# Patient Record
Sex: MALE | Race: WHITE | Employment: OTHER | URBAN - METROPOLITAN AREA
[De-identification: names, ages, dates, MRNs, and addresses within clinical notes are randomized per-mention and may not be internally consistent; named-entity substitution may affect disease eponyms.]

---

## 2017-12-26 LAB
ANION GAP SERPL CALCULATED.3IONS-SCNC: 14 MEQ/L (ref 7–13)
BASOPHILS ABSOLUTE: 0 K/UL (ref 0–0.2)
BASOPHILS RELATIVE PERCENT: 0.4 %
BUN BLDV-MCNC: 23 MG/DL (ref 8–23)
CALCIUM SERPL-MCNC: 8.2 MG/DL (ref 8.6–10.2)
CHLORIDE BLD-SCNC: 101 MEQ/L (ref 98–107)
CHOLESTEROL, TOTAL: 120 MG/DL (ref 0–199)
CO2: 23 MEQ/L (ref 22–29)
CREAT SERPL-MCNC: 1.06 MG/DL (ref 0.7–1.2)
EOSINOPHILS ABSOLUTE: 0.3 K/UL (ref 0–0.7)
EOSINOPHILS RELATIVE PERCENT: 3.9 %
GFR AFRICAN AMERICAN: >60
GFR NON-AFRICAN AMERICAN: >60
GLUCOSE BLD-MCNC: 99 MG/DL (ref 74–109)
HCT VFR BLD CALC: 36.3 % (ref 42–52)
HDLC SERPL-MCNC: 35 MG/DL (ref 40–59)
HEMOGLOBIN: 12.3 G/DL (ref 14–18)
LDL CHOLESTEROL CALCULATED: 68 MG/DL (ref 0–129)
LYMPHOCYTES ABSOLUTE: 1 K/UL (ref 1–4.8)
LYMPHOCYTES RELATIVE PERCENT: 13.4 %
MCH RBC QN AUTO: 30.8 PG (ref 27–31.3)
MCHC RBC AUTO-ENTMCNC: 33.8 % (ref 33–37)
MCV RBC AUTO: 91.1 FL (ref 80–100)
MONOCYTES ABSOLUTE: 0.9 K/UL (ref 0.2–0.8)
MONOCYTES RELATIVE PERCENT: 12.1 %
NEUTROPHILS ABSOLUTE: 5.1 K/UL (ref 1.4–6.5)
NEUTROPHILS RELATIVE PERCENT: 70.2 %
PDW BLD-RTO: 14.4 % (ref 11.5–14.5)
PLATELET # BLD: 152 K/UL (ref 130–400)
POTASSIUM SERPL-SCNC: 3.9 MEQ/L (ref 3.5–5.1)
RBC # BLD: 3.99 M/UL (ref 4.7–6.1)
SODIUM BLD-SCNC: 138 MEQ/L (ref 132–144)
TRIGL SERPL-MCNC: 86 MG/DL (ref 0–200)
TSH SERPL DL<=0.05 MIU/L-ACNC: 3.37 UIU/ML (ref 0.27–4.2)
WBC # BLD: 7.3 K/UL (ref 4.8–10.8)

## 2017-12-27 RX ORDER — GUAIFENESIN AND DEXTROMETHORPHAN HYDROBROMIDE 100; 10 MG/5ML; MG/5ML
5 SOLUTION ORAL EVERY 6 HOURS PRN
COMMUNITY
Start: 2017-12-24 | End: 2018-01-02

## 2017-12-27 RX ORDER — ACETAMINOPHEN 325 MG/1
650 TABLET ORAL EVERY 4 HOURS PRN
COMMUNITY
End: 2018-05-30

## 2017-12-27 RX ORDER — MAGNESIUM HYDROXIDE/ALUMINUM HYDROXICE/SIMETHICONE 120; 1200; 1200 MG/30ML; MG/30ML; MG/30ML
30 SUSPENSION ORAL EVERY 4 HOURS PRN
COMMUNITY
End: 2018-05-30

## 2017-12-27 RX ORDER — ALBUTEROL SULFATE 2.5 MG/3ML
2.5 SOLUTION RESPIRATORY (INHALATION) EVERY 4 HOURS PRN
COMMUNITY
End: 2018-01-30 | Stop reason: ALTCHOICE

## 2018-01-04 LAB
ANION GAP SERPL CALCULATED.3IONS-SCNC: 11 MEQ/L (ref 7–13)
BUN BLDV-MCNC: 20 MG/DL (ref 8–23)
CALCIUM SERPL-MCNC: 8.4 MG/DL (ref 8.6–10.2)
CHLORIDE BLD-SCNC: 97 MEQ/L (ref 98–107)
CO2: 27 MEQ/L (ref 22–29)
CREAT SERPL-MCNC: 0.95 MG/DL (ref 0.7–1.2)
GFR AFRICAN AMERICAN: >60
GFR NON-AFRICAN AMERICAN: >60
GLUCOSE BLD-MCNC: 104 MG/DL (ref 74–109)
POTASSIUM SERPL-SCNC: 4.5 MEQ/L (ref 3.5–5.1)
SODIUM BLD-SCNC: 135 MEQ/L (ref 132–144)

## 2018-01-07 LAB
RAPID INFLUENZA  B AGN: NEGATIVE
RAPID INFLUENZA A AGN: NEGATIVE

## 2018-01-30 ENCOUNTER — OFFICE VISIT (OUTPATIENT)
Dept: GERIATRIC MEDICINE | Age: 83
End: 2018-01-30
Payer: MEDICARE

## 2018-01-30 DIAGNOSIS — M19.90 ARTHRITIS: ICD-10-CM

## 2018-01-30 DIAGNOSIS — S80.821A BLISTER OF RIGHT LOWER EXTREMITY, INITIAL ENCOUNTER: ICD-10-CM

## 2018-01-30 DIAGNOSIS — F03.90 DEMENTIA WITHOUT BEHAVIORAL DISTURBANCE, UNSPECIFIED DEMENTIA TYPE: Primary | ICD-10-CM

## 2018-01-30 PROCEDURE — 99309 SBSQ NF CARE MODERATE MDM 30: CPT | Performed by: NURSE PRACTITIONER

## 2018-01-30 PROCEDURE — 1123F ACP DISCUSS/DSCN MKR DOCD: CPT | Performed by: NURSE PRACTITIONER

## 2018-01-30 ASSESSMENT — ENCOUNTER SYMPTOMS
WHEEZING: 0
COUGH: 0
ABDOMINAL PAIN: 0
SHORTNESS OF BREATH: 0

## 2018-01-31 VITALS
WEIGHT: 159 LBS | OXYGEN SATURATION: 95 % | SYSTOLIC BLOOD PRESSURE: 119 MMHG | HEART RATE: 75 BPM | BODY MASS INDEX: 25.55 KG/M2 | HEIGHT: 66 IN | TEMPERATURE: 97.9 F | RESPIRATION RATE: 17 BRPM | DIASTOLIC BLOOD PRESSURE: 64 MMHG

## 2018-01-31 RX ORDER — ALBUTEROL SULFATE 2.5 MG/3ML
2.5 SOLUTION RESPIRATORY (INHALATION) EVERY 4 HOURS PRN
COMMUNITY

## 2018-02-16 ENCOUNTER — OFFICE VISIT (OUTPATIENT)
Dept: GERIATRIC MEDICINE | Age: 83
End: 2018-02-16
Payer: MEDICARE

## 2018-02-16 VITALS
DIASTOLIC BLOOD PRESSURE: 70 MMHG | HEART RATE: 71 BPM | SYSTOLIC BLOOD PRESSURE: 110 MMHG | OXYGEN SATURATION: 95 % | WEIGHT: 156 LBS | TEMPERATURE: 97.7 F | RESPIRATION RATE: 18 BRPM | BODY MASS INDEX: 25.07 KG/M2 | HEIGHT: 66 IN

## 2018-02-16 DIAGNOSIS — R60.0 BILATERAL LOWER EXTREMITY EDEMA: Primary | ICD-10-CM

## 2018-02-16 DIAGNOSIS — I10 ESSENTIAL HYPERTENSION: ICD-10-CM

## 2018-02-16 PROCEDURE — 99308 SBSQ NF CARE LOW MDM 20: CPT | Performed by: NURSE PRACTITIONER

## 2018-02-16 PROCEDURE — 1123F ACP DISCUSS/DSCN MKR DOCD: CPT | Performed by: NURSE PRACTITIONER

## 2018-02-20 ENCOUNTER — OFFICE VISIT (OUTPATIENT)
Dept: GERIATRIC MEDICINE | Age: 83
End: 2018-02-20
Payer: MEDICARE

## 2018-02-20 DIAGNOSIS — R60.0 BILATERAL LOWER EXTREMITY EDEMA: Primary | ICD-10-CM

## 2018-02-20 DIAGNOSIS — I10 ESSENTIAL HYPERTENSION: ICD-10-CM

## 2018-02-20 LAB
ANION GAP SERPL CALCULATED.3IONS-SCNC: 15 MEQ/L (ref 7–13)
BUN BLDV-MCNC: 16 MG/DL (ref 8–23)
CALCIUM SERPL-MCNC: 8.5 MG/DL (ref 8.6–10.2)
CHLORIDE BLD-SCNC: 101 MEQ/L (ref 98–107)
CO2: 22 MEQ/L (ref 22–29)
CREAT SERPL-MCNC: 0.93 MG/DL (ref 0.7–1.2)
GFR AFRICAN AMERICAN: >60
GFR NON-AFRICAN AMERICAN: >60
GLUCOSE BLD-MCNC: 135 MG/DL (ref 74–109)
POTASSIUM SERPL-SCNC: 4.1 MEQ/L (ref 3.5–5.1)
SODIUM BLD-SCNC: 138 MEQ/L (ref 132–144)

## 2018-02-20 PROCEDURE — 99308 SBSQ NF CARE LOW MDM 20: CPT | Performed by: NURSE PRACTITIONER

## 2018-02-20 PROCEDURE — 1123F ACP DISCUSS/DSCN MKR DOCD: CPT | Performed by: NURSE PRACTITIONER

## 2018-02-21 VITALS
BODY MASS INDEX: 25.07 KG/M2 | HEIGHT: 66 IN | RESPIRATION RATE: 18 BRPM | HEART RATE: 87 BPM | OXYGEN SATURATION: 96 % | TEMPERATURE: 98 F | WEIGHT: 156 LBS | DIASTOLIC BLOOD PRESSURE: 63 MMHG | SYSTOLIC BLOOD PRESSURE: 125 MMHG

## 2018-02-27 LAB
ANION GAP SERPL CALCULATED.3IONS-SCNC: 13 MEQ/L (ref 7–13)
BUN BLDV-MCNC: 27 MG/DL (ref 8–23)
CALCIUM SERPL-MCNC: 8.6 MG/DL (ref 8.6–10.2)
CHLORIDE BLD-SCNC: 100 MEQ/L (ref 98–107)
CO2: 28 MEQ/L (ref 22–29)
CREAT SERPL-MCNC: 1.01 MG/DL (ref 0.7–1.2)
GFR AFRICAN AMERICAN: >60
GFR NON-AFRICAN AMERICAN: >60
GLUCOSE BLD-MCNC: 114 MG/DL (ref 74–109)
HCT VFR BLD CALC: 40.1 % (ref 42–52)
HEMOGLOBIN: 13.2 G/DL (ref 14–18)
MCH RBC QN AUTO: 29.8 PG (ref 27–31.3)
MCHC RBC AUTO-ENTMCNC: 32.9 % (ref 33–37)
MCV RBC AUTO: 90.6 FL (ref 80–100)
PDW BLD-RTO: 15.6 % (ref 11.5–14.5)
PLATELET # BLD: 272 K/UL (ref 130–400)
POTASSIUM SERPL-SCNC: 4.3 MEQ/L (ref 3.5–5.1)
RBC # BLD: 4.43 M/UL (ref 4.7–6.1)
SODIUM BLD-SCNC: 141 MEQ/L (ref 132–144)
WBC # BLD: 8.1 K/UL (ref 4.8–10.8)

## 2018-03-01 ENCOUNTER — OFFICE VISIT (OUTPATIENT)
Dept: GERIATRIC MEDICINE | Age: 83
End: 2018-03-01
Payer: MEDICARE

## 2018-03-01 DIAGNOSIS — R60.0 BILATERAL LOWER EXTREMITY EDEMA: Primary | ICD-10-CM

## 2018-03-01 DIAGNOSIS — L30.9 DERMATITIS: ICD-10-CM

## 2018-03-01 PROCEDURE — 1123F ACP DISCUSS/DSCN MKR DOCD: CPT | Performed by: NURSE PRACTITIONER

## 2018-03-01 PROCEDURE — 99308 SBSQ NF CARE LOW MDM 20: CPT | Performed by: NURSE PRACTITIONER

## 2018-03-02 ENCOUNTER — OFFICE VISIT (OUTPATIENT)
Dept: GERIATRIC MEDICINE | Age: 83
End: 2018-03-02
Payer: MEDICARE

## 2018-03-02 VITALS
BODY MASS INDEX: 26.2 KG/M2 | OXYGEN SATURATION: 96 % | TEMPERATURE: 97.7 F | HEART RATE: 81 BPM | HEIGHT: 66 IN | SYSTOLIC BLOOD PRESSURE: 120 MMHG | WEIGHT: 163 LBS | DIASTOLIC BLOOD PRESSURE: 61 MMHG | RESPIRATION RATE: 18 BRPM

## 2018-03-02 DIAGNOSIS — R33.9 URINARY RETENTION: Primary | ICD-10-CM

## 2018-03-02 LAB
ANION GAP SERPL CALCULATED.3IONS-SCNC: 13 MEQ/L (ref 7–13)
BILIRUBIN URINE: NEGATIVE
BLOOD, URINE: NEGATIVE
BUN BLDV-MCNC: 19 MG/DL (ref 8–23)
CALCIUM SERPL-MCNC: 8.7 MG/DL (ref 8.6–10.2)
CHLORIDE BLD-SCNC: 97 MEQ/L (ref 98–107)
CLARITY: CLEAR
CO2: 28 MEQ/L (ref 22–29)
COLOR: YELLOW
CREAT SERPL-MCNC: 1.01 MG/DL (ref 0.7–1.2)
GFR AFRICAN AMERICAN: >60
GFR NON-AFRICAN AMERICAN: >60
GLUCOSE BLD-MCNC: 114 MG/DL (ref 74–109)
GLUCOSE URINE: NEGATIVE MG/DL
KETONES, URINE: NEGATIVE MG/DL
LEUKOCYTE ESTERASE, URINE: NEGATIVE
NITRITE, URINE: NEGATIVE
PH UA: 6 (ref 5–9)
POTASSIUM SERPL-SCNC: 4.7 MEQ/L (ref 3.5–5.1)
PROTEIN UA: NEGATIVE MG/DL
SODIUM BLD-SCNC: 138 MEQ/L (ref 132–144)
SPECIFIC GRAVITY UA: 1.02 (ref 1–1.03)
UROBILINOGEN, URINE: 0.2 E.U./DL

## 2018-03-02 PROCEDURE — 1123F ACP DISCUSS/DSCN MKR DOCD: CPT | Performed by: NURSE PRACTITIONER

## 2018-03-02 PROCEDURE — 99308 SBSQ NF CARE LOW MDM 20: CPT | Performed by: NURSE PRACTITIONER

## 2018-03-05 VITALS
OXYGEN SATURATION: 96 % | HEIGHT: 66 IN | SYSTOLIC BLOOD PRESSURE: 115 MMHG | TEMPERATURE: 98 F | RESPIRATION RATE: 16 BRPM | HEART RATE: 74 BPM | DIASTOLIC BLOOD PRESSURE: 63 MMHG | BODY MASS INDEX: 25.88 KG/M2 | WEIGHT: 161 LBS

## 2018-03-05 LAB — URINE CULTURE, ROUTINE: NORMAL

## 2018-03-07 LAB
ALBUMIN SERPL-MCNC: 3.6 G/DL
ALP BLD-CCNC: 66 U/L
ALT SERPL-CCNC: 16 U/L
ANION GAP SERPL CALCULATED.3IONS-SCNC: 19 MMOL/L
AST SERPL-CCNC: 24 U/L
BILIRUB SERPL-MCNC: 0.3 MG/DL (ref 0.1–1.4)
BUN BLDV-MCNC: 24 MG/DL
CALCIUM SERPL-MCNC: 8.7 MG/DL
CHLORIDE BLD-SCNC: 97 MMOL/L
CO2: 23 MMOL/L
CREAT SERPL-MCNC: 1.1 MG/DL
GFR CALCULATED: NORMAL
GLUCOSE BLD-MCNC: 96 MG/DL
POTASSIUM SERPL-SCNC: 4.3 MMOL/L
SODIUM BLD-SCNC: 139 MMOL/L
TOTAL PROTEIN: 5.9

## 2018-03-13 ENCOUNTER — OFFICE VISIT (OUTPATIENT)
Dept: GERIATRIC MEDICINE | Age: 83
End: 2018-03-13
Payer: MEDICARE

## 2018-03-13 DIAGNOSIS — R60.0 BILATERAL LOWER EXTREMITY EDEMA: Primary | ICD-10-CM

## 2018-03-13 DIAGNOSIS — R53.1 GENERALIZED WEAKNESS: ICD-10-CM

## 2018-03-13 LAB
ANION GAP SERPL CALCULATED.3IONS-SCNC: 17 MEQ/L (ref 7–13)
BUN BLDV-MCNC: 33 MG/DL (ref 8–23)
CALCIUM SERPL-MCNC: 8.7 MG/DL (ref 8.6–10.2)
CHLORIDE BLD-SCNC: 94 MEQ/L (ref 98–107)
CO2: 26 MEQ/L (ref 22–29)
CREAT SERPL-MCNC: 1.38 MG/DL (ref 0.7–1.2)
GFR AFRICAN AMERICAN: 58.5
GFR NON-AFRICAN AMERICAN: 48.4
GLUCOSE BLD-MCNC: 154 MG/DL (ref 74–109)
POTASSIUM SERPL-SCNC: 3.9 MEQ/L (ref 3.5–5.1)
SODIUM BLD-SCNC: 137 MEQ/L (ref 132–144)

## 2018-03-13 PROCEDURE — 1123F ACP DISCUSS/DSCN MKR DOCD: CPT | Performed by: NURSE PRACTITIONER

## 2018-03-13 PROCEDURE — 99308 SBSQ NF CARE LOW MDM 20: CPT | Performed by: NURSE PRACTITIONER

## 2018-03-14 VITALS
RESPIRATION RATE: 16 BRPM | WEIGHT: 158 LBS | OXYGEN SATURATION: 96 % | BODY MASS INDEX: 25.39 KG/M2 | HEART RATE: 70 BPM | HEIGHT: 66 IN | DIASTOLIC BLOOD PRESSURE: 76 MMHG | TEMPERATURE: 98 F | SYSTOLIC BLOOD PRESSURE: 122 MMHG

## 2018-03-20 NOTE — PROGRESS NOTES
[address] [facility]  Mellissa Astorga  [facility] [address] [phone] Fax    [address], 80 y.o. male presents today with:  Chief Complaint   Patient presents with    Urinary Retention     HPI  Patient is seen today for new problem of urinary retention. He was noted to have some abdominal distention last night and complaints that he could not urinate even though he felt like he had to. He was straight cathed and was found to have a large amount of urine in his bladder. He is a poor historian, but does deny pain, no fever or chills. Given his advanced dementia and overall decline, would consider hospice referral.    Review of Systems   Constitutional: Positive for fatigue. Genitourinary: Positive for difficulty urinating. Negative for flank pain. Psychiatric/Behavioral: Positive for confusion. ROS limited due to dementia    Past Medical History:   Diagnosis Date    CAD (coronary artery disease)     Gout     Hard of hearing     Hypertension     Osteoarthritis     Senile dementia      No past surgical history on file. No family history on file. No Known Allergies  Current Outpatient Prescriptions   Medication Sig Dispense Refill    albuterol (PROVENTIL) (2.5 MG/3ML) 0.083% nebulizer solution Take 2.5 mg by nebulization every 4 hours as needed for Wheezing or Shortness of Breath      aspirin 81 MG tablet Take 81 mg by mouth daily      aluminum & magnesium hydroxide-simethicone (MYLANTA) 200-200-20 MG/5ML SUSP suspension Take 30 mLs by mouth every 4 hours as needed for Indigestion      acetaminophen (TYLENOL) 325 MG tablet Take 650 mg by mouth every 4 hours as needed for Pain or Fever       No current facility-administered medications for this visit.         Objective  Vitals:    03/02/18 0905   BP: 115/63   Pulse: 74   Resp: 16   Temp: 98 °F (36.7 °C)   SpO2: 96%   Weight: 161 lb (73 kg)   Height: 5' 6\" (1.676 m)     Physical Exam   Constitutional: No distress. Alert, frail appearing   Cardiovascular: Normal rate, regular rhythm and normal heart sounds. bilateral lower extremity edema   Pulmonary/Chest: Effort normal and breath sounds normal. He has no wheezes. Abdominal: Soft. Bowel sounds are normal. He exhibits no distension. There is no tenderness. Genitourinary:   Genitourinary Comments: King cath draining yellow urine   Musculoskeletal: Normal range of motion. He exhibits no tenderness. General weakness   Vitals reviewed. Assessment & Plan   1. Urinary retention      New, send UA C&S. Maintain King cath. Consider urology consult vs hospice referral     Medications reviewed and to continue current plan  Encourage fluid intake, exercise as tolerated, and good nutrition. Continue shelter care, and continue to stress fall prevention strategies. Return if symptoms worsen or fail to improve, for routine follow up as scheduled.     Jennifer Murphy, CNP

## 2018-05-25 ENCOUNTER — OFFICE VISIT (OUTPATIENT)
Dept: GERIATRIC MEDICINE | Age: 83
End: 2018-05-25
Payer: MEDICARE

## 2018-05-25 DIAGNOSIS — R53.1 GENERALIZED WEAKNESS: ICD-10-CM

## 2018-05-25 DIAGNOSIS — F03.90 DEMENTIA WITHOUT BEHAVIORAL DISTURBANCE, UNSPECIFIED DEMENTIA TYPE: Primary | ICD-10-CM

## 2018-05-25 DIAGNOSIS — R63.0 POOR APPETITE: ICD-10-CM

## 2018-05-25 PROCEDURE — 1123F ACP DISCUSS/DSCN MKR DOCD: CPT | Performed by: NURSE PRACTITIONER

## 2018-05-25 PROCEDURE — 99309 SBSQ NF CARE MODERATE MDM 30: CPT | Performed by: NURSE PRACTITIONER

## 2018-05-29 VITALS
BODY MASS INDEX: 25.55 KG/M2 | TEMPERATURE: 98.3 F | HEIGHT: 66 IN | OXYGEN SATURATION: 96 % | DIASTOLIC BLOOD PRESSURE: 70 MMHG | HEART RATE: 68 BPM | WEIGHT: 159 LBS | RESPIRATION RATE: 18 BRPM | SYSTOLIC BLOOD PRESSURE: 135 MMHG

## 2018-05-29 RX ORDER — POTASSIUM CHLORIDE 20 MEQ/1
20 TABLET, EXTENDED RELEASE ORAL DAILY
COMMUNITY

## 2018-05-29 RX ORDER — DIAPER,BRIEF,INFANT-TODD,DISP
EACH MISCELLANEOUS 2 TIMES DAILY
COMMUNITY

## 2018-05-29 RX ORDER — OLOPATADINE HYDROCHLORIDE 2 MG/ML
1 SOLUTION/ DROPS OPHTHALMIC DAILY
COMMUNITY
End: 2018-11-30

## 2018-05-29 RX ORDER — MIRTAZAPINE 15 MG/1
7.5 TABLET, FILM COATED ORAL NIGHTLY
COMMUNITY

## 2018-05-29 RX ORDER — DIPHENHYDRAMINE HCL 25 MG
25 TABLET ORAL EVERY 8 HOURS PRN
COMMUNITY

## 2018-05-29 RX ORDER — FUROSEMIDE 40 MG/1
60 TABLET ORAL DAILY
COMMUNITY

## 2018-05-29 RX ORDER — ACETAMINOPHEN 650 MG/1
650 SUPPOSITORY RECTAL 2 TIMES DAILY
COMMUNITY

## 2018-06-21 LAB
BACTERIA: ABNORMAL /HPF
BILIRUBIN URINE: NEGATIVE
BLOOD, URINE: ABNORMAL
CLARITY: ABNORMAL
COLOR: YELLOW
GLUCOSE URINE: NEGATIVE MG/DL
KETONES, URINE: NEGATIVE MG/DL
LEUKOCYTE ESTERASE, URINE: ABNORMAL
NITRITE, URINE: POSITIVE
PH UA: 6.5 (ref 5–9)
PROTEIN UA: ABNORMAL MG/DL
RBC UA: ABNORMAL /HPF (ref 0–2)
SPECIFIC GRAVITY UA: 1.01 (ref 1–1.03)
UROBILINOGEN, URINE: 0.2 E.U./DL
WBC UA: >100 /HPF (ref 0–5)

## 2018-06-22 ENCOUNTER — OFFICE VISIT (OUTPATIENT)
Dept: GERIATRIC MEDICINE | Age: 83
End: 2018-06-22
Payer: MEDICARE

## 2018-06-22 DIAGNOSIS — R50.9 FEVER, UNSPECIFIED FEVER CAUSE: Primary | ICD-10-CM

## 2018-06-22 DIAGNOSIS — N13.9 OBSTRUCTED, UROPATHY: ICD-10-CM

## 2018-06-22 DIAGNOSIS — F03.90 DEMENTIA WITHOUT BEHAVIORAL DISTURBANCE, UNSPECIFIED DEMENTIA TYPE: ICD-10-CM

## 2018-06-22 PROCEDURE — 1101F PT FALLS ASSESS-DOCD LE1/YR: CPT | Performed by: NURSE PRACTITIONER

## 2018-06-22 PROCEDURE — 1123F ACP DISCUSS/DSCN MKR DOCD: CPT | Performed by: NURSE PRACTITIONER

## 2018-06-22 PROCEDURE — 99309 SBSQ NF CARE MODERATE MDM 30: CPT | Performed by: NURSE PRACTITIONER

## 2018-06-23 LAB
ORGANISM: ABNORMAL
URINE CULTURE, ROUTINE: ABNORMAL
URINE CULTURE, ROUTINE: ABNORMAL

## 2018-06-27 LAB
ANION GAP SERPL CALCULATED.3IONS-SCNC: 13 MEQ/L (ref 7–13)
BUN BLDV-MCNC: 24 MG/DL (ref 8–23)
CALCIUM SERPL-MCNC: 8.8 MG/DL (ref 8.6–10.2)
CHLORIDE BLD-SCNC: 98 MEQ/L (ref 98–107)
CO2: 24 MEQ/L (ref 22–29)
CREAT SERPL-MCNC: 1.03 MG/DL (ref 0.7–1.2)
GFR AFRICAN AMERICAN: >60
GFR NON-AFRICAN AMERICAN: >60
GLUCOSE BLD-MCNC: 100 MG/DL (ref 74–109)
POTASSIUM SERPL-SCNC: 4.3 MEQ/L (ref 3.5–5.1)
SODIUM BLD-SCNC: 135 MEQ/L (ref 132–144)

## 2018-06-28 ENCOUNTER — OFFICE VISIT (OUTPATIENT)
Dept: GERIATRIC MEDICINE | Age: 83
End: 2018-06-28
Payer: MEDICARE

## 2018-06-28 DIAGNOSIS — R62.7 ADULT FAILURE TO THRIVE: Primary | ICD-10-CM

## 2018-06-28 DIAGNOSIS — M15.9 PRIMARY OSTEOARTHRITIS INVOLVING MULTIPLE JOINTS: ICD-10-CM

## 2018-06-28 DIAGNOSIS — F03.90 DEMENTIA WITHOUT BEHAVIORAL DISTURBANCE, UNSPECIFIED DEMENTIA TYPE: ICD-10-CM

## 2018-06-28 PROCEDURE — 99308 SBSQ NF CARE LOW MDM 20: CPT | Performed by: INTERNAL MEDICINE

## 2018-06-28 PROCEDURE — 1101F PT FALLS ASSESS-DOCD LE1/YR: CPT | Performed by: INTERNAL MEDICINE

## 2018-06-28 PROCEDURE — 1123F ACP DISCUSS/DSCN MKR DOCD: CPT | Performed by: INTERNAL MEDICINE

## 2018-06-29 ENCOUNTER — OFFICE VISIT (OUTPATIENT)
Dept: GERIATRIC MEDICINE | Age: 83
End: 2018-06-29
Payer: MEDICARE

## 2018-06-29 DIAGNOSIS — F02.80 LATE ONSET ALZHEIMER'S DISEASE WITHOUT BEHAVIORAL DISTURBANCE (HCC): ICD-10-CM

## 2018-06-29 DIAGNOSIS — N30.00 ACUTE CYSTITIS WITHOUT HEMATURIA: Primary | ICD-10-CM

## 2018-06-29 DIAGNOSIS — G30.1 LATE ONSET ALZHEIMER'S DISEASE WITHOUT BEHAVIORAL DISTURBANCE (HCC): ICD-10-CM

## 2018-06-29 PROCEDURE — 1123F ACP DISCUSS/DSCN MKR DOCD: CPT | Performed by: NURSE PRACTITIONER

## 2018-06-29 PROCEDURE — 99308 SBSQ NF CARE LOW MDM 20: CPT | Performed by: NURSE PRACTITIONER

## 2018-06-29 PROCEDURE — 1101F PT FALLS ASSESS-DOCD LE1/YR: CPT | Performed by: NURSE PRACTITIONER

## 2018-07-09 ENCOUNTER — OFFICE VISIT (OUTPATIENT)
Dept: GERIATRIC MEDICINE | Age: 83
End: 2018-07-09
Payer: MEDICARE

## 2018-07-09 DIAGNOSIS — I10 ESSENTIAL HYPERTENSION: ICD-10-CM

## 2018-07-09 DIAGNOSIS — M19.90 ARTHRITIS: ICD-10-CM

## 2018-07-09 DIAGNOSIS — M10.00 IDIOPATHIC GOUT, UNSPECIFIED CHRONICITY, UNSPECIFIED SITE: ICD-10-CM

## 2018-07-09 DIAGNOSIS — F03.90 DEMENTIA WITHOUT BEHAVIORAL DISTURBANCE, UNSPECIFIED DEMENTIA TYPE: Primary | ICD-10-CM

## 2018-07-09 PROCEDURE — 1101F PT FALLS ASSESS-DOCD LE1/YR: CPT | Performed by: NURSE PRACTITIONER

## 2018-07-09 PROCEDURE — 99308 SBSQ NF CARE LOW MDM 20: CPT | Performed by: NURSE PRACTITIONER

## 2018-07-09 PROCEDURE — 1123F ACP DISCUSS/DSCN MKR DOCD: CPT | Performed by: NURSE PRACTITIONER

## 2018-07-27 PROBLEM — N13.9 OBSTRUCTED, UROPATHY: Status: ACTIVE | Noted: 2018-07-27

## 2018-07-27 PROBLEM — F03.90 DEMENTIA WITHOUT BEHAVIORAL DISTURBANCE (HCC): Status: ACTIVE | Noted: 2018-07-27

## 2018-07-30 PROBLEM — M10.00 IDIOPATHIC GOUT: Status: ACTIVE | Noted: 2018-07-30

## 2018-07-30 NOTE — PROGRESS NOTES
PATIENTNeaura Das : 10/23/1927 DOS: 2018     [facility]DI    The patient was here for therapy, is now a long term care pt. He is now on hospice. He has advanced dementia. He has a King catheter and does unfortunately get UTI, recently has been treated for UTI. He uses wheelchair for locomotion throughout the facility. He is very active, very restless. He states he feels better. He is thin and frail overall. He actually does; however, eat and drink fairly well, especially if drinks are offered to him. He does not have any behavior issues except for the hyperactivity. He has some fungal nail issues that Dr. Alisa Chavez saw him for in the past. He has not had any recent gout exacerbation. There is no choking, no aspiration pneumonia symptoms. He makes no complaints of pain even though he has osteoarthritis. No vomiting or diarrhea issues from the antibiotics and no thrush. MEDICATIONS AND ALLERGIES: Reviewed in the nursing facility chart. PMH: SEE [facility]                FAMILY MEDICAL HX/SOCIAL HX: see [facility]     PHYSICAL ASSESSMENT: Vital Signs: See note. The patient is up in the wheelchair. He is thin, frail, pleasant. He is answering questions, but he is forgetful. Heart is regular rate. Lungs are clear to auscultation. Abdomen: No hepatomegaly. Positive bowel sounds. King is draining yellow urine with some mucus strands. Lower extremities have 1+ edema bilaterally. ASSESSMENT AND PLAN:   1. Dementia, stable after recent advancement, the patient is on hospice. 2.  Osteoarthritis: No pain issues. 3.  Hypertension: Vital signs are stable. 4.  Gout: No recent exacerbations. We will keep him comfortable, symptom management only. We will continue to follow.           Electronically Signed By: OSCAR Kay GNP-BC on 2018 13:33:01  ______________________________  OSCAR Kay GNP-BC  /[id number]  D: 07/15/2018 22:44:14  T: 2018 13:35:10

## 2018-08-26 ENCOUNTER — OFFICE VISIT (OUTPATIENT)
Dept: GERIATRIC MEDICINE | Age: 83
End: 2018-08-26
Payer: MEDICARE

## 2018-08-26 DIAGNOSIS — R31.0 GROSS HEMATURIA: Primary | ICD-10-CM

## 2018-08-26 DIAGNOSIS — R06.02 SOB (SHORTNESS OF BREATH): ICD-10-CM

## 2018-08-26 DIAGNOSIS — I95.9 HYPOTENSION, UNSPECIFIED HYPOTENSION TYPE: ICD-10-CM

## 2018-08-26 PROCEDURE — 1101F PT FALLS ASSESS-DOCD LE1/YR: CPT | Performed by: NURSE PRACTITIONER

## 2018-08-26 PROCEDURE — 1123F ACP DISCUSS/DSCN MKR DOCD: CPT | Performed by: NURSE PRACTITIONER

## 2018-08-26 PROCEDURE — 99309 SBSQ NF CARE MODERATE MDM 30: CPT | Performed by: NURSE PRACTITIONER

## 2018-08-28 DIAGNOSIS — R52 GENERALIZED PAIN: Primary | ICD-10-CM

## 2018-08-28 RX ORDER — TRAMADOL HYDROCHLORIDE 50 MG/1
50 TABLET ORAL EVERY 6 HOURS PRN
Qty: 45 TABLET | Refills: 2 | Status: SHIPPED | OUTPATIENT
Start: 2018-08-28 | End: 2018-09-07

## 2018-08-29 ENCOUNTER — OFFICE VISIT (OUTPATIENT)
Dept: GERIATRIC MEDICINE | Age: 83
End: 2018-08-29
Payer: MEDICARE

## 2018-08-29 DIAGNOSIS — S39.94XA INJURY TO PENIS, INITIAL ENCOUNTER: Primary | ICD-10-CM

## 2018-08-29 DIAGNOSIS — N13.9 OBSTRUCTED, UROPATHY: ICD-10-CM

## 2018-08-29 PROCEDURE — 99309 SBSQ NF CARE MODERATE MDM 30: CPT | Performed by: NURSE PRACTITIONER

## 2018-08-29 PROCEDURE — 1123F ACP DISCUSS/DSCN MKR DOCD: CPT | Performed by: NURSE PRACTITIONER

## 2018-08-29 PROCEDURE — 1101F PT FALLS ASSESS-DOCD LE1/YR: CPT | Performed by: NURSE PRACTITIONER

## 2018-09-11 ENCOUNTER — OFFICE VISIT (OUTPATIENT)
Dept: GERIATRIC MEDICINE | Age: 83
End: 2018-09-11
Payer: MEDICARE

## 2018-09-11 DIAGNOSIS — N13.9 OBSTRUCTED, UROPATHY: ICD-10-CM

## 2018-09-11 DIAGNOSIS — F03.90 DEMENTIA WITHOUT BEHAVIORAL DISTURBANCE, UNSPECIFIED DEMENTIA TYPE: Primary | ICD-10-CM

## 2018-09-11 DIAGNOSIS — S39.94XD INJURY TO PENIS, SUBSEQUENT ENCOUNTER: ICD-10-CM

## 2018-09-11 DIAGNOSIS — R27.0 ATAXIA: ICD-10-CM

## 2018-09-11 PROCEDURE — 1123F ACP DISCUSS/DSCN MKR DOCD: CPT | Performed by: NURSE PRACTITIONER

## 2018-09-11 PROCEDURE — 99309 SBSQ NF CARE MODERATE MDM 30: CPT | Performed by: NURSE PRACTITIONER

## 2018-09-11 PROCEDURE — 1101F PT FALLS ASSESS-DOCD LE1/YR: CPT | Performed by: NURSE PRACTITIONER

## 2018-09-12 RX ORDER — LORAZEPAM 1 MG/1
0.5 TABLET ORAL EVERY 4 HOURS PRN
Qty: 60 TABLET | Refills: 1 | OUTPATIENT
Start: 2018-09-12 | End: 2018-10-12

## 2018-09-13 VITALS — OXYGEN SATURATION: 80 % | RESPIRATION RATE: 32 BRPM | HEART RATE: 100 BPM

## 2018-09-13 NOTE — PROGRESS NOTES
22:17:20  ______________________________  OSCAR Tolentino GNP-BC  /[id number]  D: 09/02/2018 14:09:29  T: 09/03/2018 03:30:13    cc: - Basim Baxter

## 2018-09-14 NOTE — PROGRESS NOTES
PATIENT: Mansoor Flores : 10/23/1927 DOS: 2018     [facility]    The patient is being seen for penis injury. Nursing states the King catheter has split open his penis along the shaft. This patient just had issues on  where he had had no urine output when the King became kinked. He had then a bloody urine in the bag. He was having shakes, hypoxia and change in mental condition, was placed on antibiotic and UA C&S was sent for evaluation prior to that 1st dose, it did show he had a Pseudomonas UTI and he still remains on Cipro for that. The patient does have dementia. He is on hospice, but he is able to talk, make complaints for himself. He is generally a good eater. For the last few days, he has been up again, out of the bed, in the wheelchair, traveling all over the facility in the wheelchair and he is social. He has had no further issues. Clear yellow urine in the King. Fam medical hx & social medical hx: see [facility]    Allergies and medications reviewed in [facility] chart   Past Medical History:   Diagnosis Date    CAD (coronary artery disease)     Dementia without behavioral disturbance 2018    Gout     Hard of hearing     Hypertension     Obstructed, uropathy 2018    Osteoarthritis     Senile dementia        PHYSICAL ASSESSMENT: Vital Signs: See progress note for vital signs. The patient was put back in bed. Posterior aspect of penis shaft is completely ripped open. The edges are smooth, slightly moist along the urethral track. No drainage. No pus. No cellulitis or infection is noted. At the base of the penis where the ureter enters the body did show a small amount of swelling and redness and no suprapubic tenderness. There is clear, yellow urine in the bag. ASSESSMENT AND PLAN: Penis injury from King catheter: The catheter was discontinued. The patient will  urinante on his own or he will be straight cath  with the coude catheter.  However, we requested nursing to get coude catheter in place. He had had a history of obstructive uropathy from the BPH and he may now have an opening large enough in the urethra from this injury. He no longer can have a King catheter at this time. If he needs to have a catheter draining bladder, we will consider suprapubic catheters and we will send him to Urology. He was placed on Bactrim for possible UTI, but we will leave him on that for now for a skin injury to reduce any chance of a cellulitis and we will keep him on the Cipro for the UTI. We will leave this area open to air. It should heal on its own and we will continue to follow as needed.           Electronically Signed By: OSCAR Traylor GNP-BC on 09/10/2018 22:05:21  ______________________________  OSCAR Traylor GNP-BC  /[id number]  D: 09/04/2018 18:01:11  T: 09/05/2018 13:44:11    cc: - Amy Shah

## 2018-10-06 PROBLEM — S39.94XA INJURY TO PENIS: Status: ACTIVE | Noted: 2018-10-06

## 2018-10-23 ENCOUNTER — OFFICE VISIT (OUTPATIENT)
Dept: GERIATRIC MEDICINE | Age: 83
End: 2018-10-23
Payer: MEDICARE

## 2018-10-23 DIAGNOSIS — F03.90 DEMENTIA WITHOUT BEHAVIORAL DISTURBANCE, UNSPECIFIED DEMENTIA TYPE: Primary | ICD-10-CM

## 2018-10-23 DIAGNOSIS — M15.9 OSTEOARTHRITIS OF MULTIPLE JOINTS, UNSPECIFIED OSTEOARTHRITIS TYPE: ICD-10-CM

## 2018-10-23 DIAGNOSIS — I10 ESSENTIAL HYPERTENSION: ICD-10-CM

## 2018-10-23 PROCEDURE — 99309 SBSQ NF CARE MODERATE MDM 30: CPT | Performed by: INTERNAL MEDICINE

## 2018-10-23 PROCEDURE — G8484 FLU IMMUNIZE NO ADMIN: HCPCS | Performed by: INTERNAL MEDICINE

## 2018-10-23 PROCEDURE — 1101F PT FALLS ASSESS-DOCD LE1/YR: CPT | Performed by: INTERNAL MEDICINE

## 2018-10-23 PROCEDURE — 1123F ACP DISCUSS/DSCN MKR DOCD: CPT | Performed by: INTERNAL MEDICINE

## 2018-11-07 NOTE — PROGRESS NOTES
PATIENT: Nandnii Pizarro : 10/23/1927 DOS: 10/23/2018     [facility]    Seen for a routine monthly visit for his dementia, ataxia, hypertension. SUBJECTIVE:  This 26-year-old gentleman was evaluated in his room. Today is his birthday. The patient is cognitively impaired. He is immobile in the facility. The patient has been on the hospice service. The patient has not had any evidence of acute psychosis, has ongoing slow decline. The patient has intermittent behavioral issues. Clinically stable at this time. OBJECTIVE:  He appeared chronically ill. Pupils are small, minimally reactive. Oral mucosa is moist.  Chest showed no crackles, no wheezing. Cardiovascular exam showed a regular rate. Abdomen was soft. No suprapubic tenderness. Extremities showed trace dorsal pedal pulse. No calf tenderness. No patellar joint effusion. ASSESSMENT AND PLAN:  1. Dementia:  The patient has ongoing slow decline. Remains on the hospice service. Continue supportive care. 2.   Hypertension:  Blood pressure is largely at target, I have relaxed protocols at this time. No evidence of acute hypertensive urgency/emergency. 3.   Degenerative joint disease:  No new pain crisis. We will monitor.          Electronically Signed By: Tristin Berry M.D. on 2018 01:00:25  ______________________________  ANI Reyes/[id number]  D: 10/24/2018 12:01:14  T: 10/25/2018 07:31:42    cc: - [address] [place] [address]

## 2018-11-21 ENCOUNTER — OFFICE VISIT (OUTPATIENT)
Dept: GERIATRIC MEDICINE | Age: 83
End: 2018-11-21
Payer: MEDICARE

## 2018-11-21 DIAGNOSIS — F02.80 LATE ONSET ALZHEIMER'S DISEASE WITHOUT BEHAVIORAL DISTURBANCE (HCC): Primary | ICD-10-CM

## 2018-11-21 DIAGNOSIS — F41.9 ANXIETY: ICD-10-CM

## 2018-11-21 DIAGNOSIS — I10 ESSENTIAL HYPERTENSION: ICD-10-CM

## 2018-11-21 DIAGNOSIS — I25.10 CORONARY ARTERY DISEASE INVOLVING NATIVE CORONARY ARTERY OF NATIVE HEART WITHOUT ANGINA PECTORIS: ICD-10-CM

## 2018-11-21 DIAGNOSIS — G30.1 LATE ONSET ALZHEIMER'S DISEASE WITHOUT BEHAVIORAL DISTURBANCE (HCC): Primary | ICD-10-CM

## 2018-11-21 PROCEDURE — 99307 SBSQ NF CARE SF MDM 10: CPT | Performed by: NURSE PRACTITIONER

## 2018-11-21 PROCEDURE — 1101F PT FALLS ASSESS-DOCD LE1/YR: CPT | Performed by: NURSE PRACTITIONER

## 2018-11-21 PROCEDURE — G8484 FLU IMMUNIZE NO ADMIN: HCPCS | Performed by: NURSE PRACTITIONER

## 2018-11-21 PROCEDURE — 1123F ACP DISCUSS/DSCN MKR DOCD: CPT | Performed by: NURSE PRACTITIONER

## 2018-11-30 VITALS
DIASTOLIC BLOOD PRESSURE: 62 MMHG | OXYGEN SATURATION: 94 % | WEIGHT: 168 LBS | RESPIRATION RATE: 20 BRPM | SYSTOLIC BLOOD PRESSURE: 142 MMHG | TEMPERATURE: 98.2 F | BODY MASS INDEX: 27.12 KG/M2 | HEART RATE: 72 BPM

## 2018-11-30 RX ORDER — MORPHINE SULFATE 100 MG/5ML
5 SOLUTION ORAL
COMMUNITY

## 2018-11-30 NOTE — PROGRESS NOTES
PATIENT: May Wilson : 10/23/1927 DOS: 2018     [facility]    HISTORY: This is a monthly follow up of his dementia with behaviors, hypertension, CAD, anxiety. The patient is hard of hearing and no longer has obstructive uropathy or King catheter. Nursing states he is at his baseline. He gets irritated at times. He has been afebrile. Good appetite. No choking or dysphagia or aspiration pneumonia. He is incontinent of urine, sometimes incontinent of bowel. He could generally tell the nursing staff when he has to have a bowel movement. He is on hospice, end-of-life. MEDICATIONS AND ALLERGIES: Reviewed in the nursing facility chart. Past Medical History:   Diagnosis Date    CAD (coronary artery disease)     Dementia without behavioral disturbance 2018    Gout     Hard of hearing     Hypertension     Injury to penis 10/6/2018    Obstructed, uropathy 2018    Osteoarthritis     Senile dementia        PHYSICAL EXAMINATION: VITAL SIGNS: Weight is 168 pounds, blood pressure 142/62, 98.2, 72, 20, 94% O2 saturation on room air. He is sitting up in his wheelchair. He is awake and alert, pleasantly confused. He has got clear speech. He is incontinent of urine at the time of my examination. Heart was regular. Lungs are clear to auscultation. Positive bowel sounds, nontender. He has 1+ edema bilaterally. DIP, PIP nodules of hands bilaterally without any pain, decreased weakened  bilaterally. ASSESSMENT AND PLAN:   1. Dementia, Alzheimer's type with behaviors. 2.  Hypertension, stable. 3.  CAD. No issues with chest pain. 4.  Anxiety, appears to be under good control. He is still hyperactive but re directed at this time. Continue hospice. No labs, diagnostics. Symptom management only.           Electronically Signed By: OSCAR Connell GNP-BC on 2018 22:53:13  ______________________________  OSCAR Connell GNP-BC  /[id number]  D: 2018 23:10:58  T: 11/26/2018 21:58:45    cc: - [facility]

## 2019-01-04 ENCOUNTER — OFFICE VISIT (OUTPATIENT)
Dept: GERIATRIC MEDICINE | Age: 84
End: 2019-01-04
Payer: MEDICARE

## 2019-01-04 DIAGNOSIS — L30.9 ECZEMA OF RIGHT UPPER EXTREMITY: ICD-10-CM

## 2019-01-04 DIAGNOSIS — N13.9 OBSTRUCTED, UROPATHY: ICD-10-CM

## 2019-01-04 DIAGNOSIS — F03.90 DEMENTIA WITHOUT BEHAVIORAL DISTURBANCE, UNSPECIFIED DEMENTIA TYPE: Primary | ICD-10-CM

## 2019-01-04 DIAGNOSIS — R27.0 ATAXIA: ICD-10-CM

## 2019-01-04 PROCEDURE — 1101F PT FALLS ASSESS-DOCD LE1/YR: CPT | Performed by: NURSE PRACTITIONER

## 2019-01-04 PROCEDURE — 1123F ACP DISCUSS/DSCN MKR DOCD: CPT | Performed by: NURSE PRACTITIONER

## 2019-01-04 PROCEDURE — G8484 FLU IMMUNIZE NO ADMIN: HCPCS | Performed by: NURSE PRACTITIONER

## 2019-01-04 PROCEDURE — 99309 SBSQ NF CARE MODERATE MDM 30: CPT | Performed by: NURSE PRACTITIONER

## 2019-02-21 VITALS
OXYGEN SATURATION: 96 % | BODY MASS INDEX: 27.83 KG/M2 | TEMPERATURE: 97.8 F | HEART RATE: 62 BPM | RESPIRATION RATE: 18 BRPM | DIASTOLIC BLOOD PRESSURE: 76 MMHG | WEIGHT: 172.4 LBS | SYSTOLIC BLOOD PRESSURE: 120 MMHG

## 2019-03-20 ENCOUNTER — OFFICE VISIT (OUTPATIENT)
Dept: GERIATRIC MEDICINE | Age: 84
End: 2019-03-20
Payer: MEDICARE

## 2019-03-20 DIAGNOSIS — N40.0 BENIGN PROSTATIC HYPERPLASIA WITHOUT LOWER URINARY TRACT SYMPTOMS: ICD-10-CM

## 2019-03-20 DIAGNOSIS — R27.0 ATAXIA: ICD-10-CM

## 2019-03-20 DIAGNOSIS — N39.43 POST-VOID DRIBBLING: ICD-10-CM

## 2019-03-20 DIAGNOSIS — F01.518 VASCULAR DEMENTIA WITH BEHAVIOR DISTURBANCE: Primary | ICD-10-CM

## 2019-03-20 PROCEDURE — 99308 SBSQ NF CARE LOW MDM 20: CPT | Performed by: NURSE PRACTITIONER

## 2019-03-20 PROCEDURE — G8484 FLU IMMUNIZE NO ADMIN: HCPCS | Performed by: NURSE PRACTITIONER

## 2019-03-20 PROCEDURE — 1123F ACP DISCUSS/DSCN MKR DOCD: CPT | Performed by: NURSE PRACTITIONER

## 2019-04-01 ENCOUNTER — OFFICE VISIT (OUTPATIENT)
Dept: GERIATRIC MEDICINE | Age: 84
End: 2019-04-01
Payer: MEDICARE

## 2019-04-01 DIAGNOSIS — K59.00 CONSTIPATION, UNSPECIFIED CONSTIPATION TYPE: ICD-10-CM

## 2019-04-01 DIAGNOSIS — F03.90 DEMENTIA WITHOUT BEHAVIORAL DISTURBANCE, UNSPECIFIED DEMENTIA TYPE: Primary | ICD-10-CM

## 2019-04-01 DIAGNOSIS — M15.9 OSTEOARTHRITIS OF MULTIPLE JOINTS, UNSPECIFIED OSTEOARTHRITIS TYPE: ICD-10-CM

## 2019-04-01 PROCEDURE — 1123F ACP DISCUSS/DSCN MKR DOCD: CPT | Performed by: INTERNAL MEDICINE

## 2019-04-01 PROCEDURE — 99309 SBSQ NF CARE MODERATE MDM 30: CPT | Performed by: INTERNAL MEDICINE

## 2019-04-03 VITALS
SYSTOLIC BLOOD PRESSURE: 112 MMHG | RESPIRATION RATE: 18 BRPM | HEART RATE: 72 BPM | DIASTOLIC BLOOD PRESSURE: 72 MMHG | OXYGEN SATURATION: 96 % | BODY MASS INDEX: 27.33 KG/M2 | WEIGHT: 169.3 LBS | TEMPERATURE: 97.9 F

## 2019-04-04 NOTE — PROGRESS NOTES
PATIENT: Maida De La Torre : 10/23/1927 DOS: 2019     [facility]    The patient is being seen for monthly followup of his chronic illnesses. The patient has BPH, dementia, ataxia. The patient is on hospice. His weight stabilized. We stopped the boost and his Magic Cups. He is eating really well. He had had an indwelling King catheter until he had a flaying of the penis with an injury. The King catheter was removed. He is not incontinent, but he is not retaining urine. He denies any pain, shortness of breath, occasional skin rashes are an issue. He is not letting the nurses grown his nails at times. He is a very uncooperative. He has intermittent agitation and irritability and at times, he is not able to be redirected. He can make complaints for himself. He answers questions and he is telling me he is fine. He has no issues. MEDICATIONS AND ALLERGIES: Reviewed in the nursing facility chart. ROS: There have been no reports of vomiting, diarrhea or constipation. No reports of chest pain, shortness of breath or upper respiratory infections. Past Medical History:   Diagnosis Date    CAD (coronary artery disease)     Dementia without behavioral disturbance 2018    Gout     Hard of hearing     Hypertension     Injury to penis 10/6/2018    Obstructed, uropathy 2018    Osteoarthritis     Senile dementia        PHYSICAL EXAMINATION: Vital signs are stable. The patient is sitting up in the wheelchair. He is in no acute distress. He is having lunch. No choking or dysphagia issues at the time of my exam. Buccal mucosa was moist and pink. His EOMs are intact. Heart was regular rate. Lungs were clear to auscultation. ABDOMEN: Nontender and nondistended. Lower extremities have trace edema bilaterally. ASSESSMENT AND PLAN:   1. Dementia with agitation and psychotic behaviors. He is on hospice. 2.  Ataxia. He uses a wheelchair for locomotion.  He really cannot ambulate any longer

## 2019-04-09 NOTE — PROGRESS NOTES
PATIENT: Alaina Davila : 10/23/1927 DOS: 2019     [facility]    Seen for routine monthly visit for his dementia, degenerative joint disease, unsteadiness/ataxia. MEDICATIONS:  Reviewed. SUBJECTIVE:  This 60-year-old gentleman was evaluated in his room. The patient has been at his baseline, has been currently on the hospice service. The patient has not had any new emesis, fevers or chills. Remains pleasant, but confused. The patient has been redirectable. Has not had any new emesis, fevers, or chills. OBJECTIVE:  He appeared chronically ill. Pupils are small, minimally reactive. Oral mucosa is moist.  Chest showed no crackles, no wheezing. Cardiovascular exam showed a regular rate. Abdomen is soft, nontender. Extremities showed trace dorsal pedal pulse. ASSESSMENT AND PLAN:  1. Dementia:  The patient is advancing in his course, remains on atypical antipsychotic, clinically stable. We will discuss with hospice to see we can wean this or not. 2.   Degenerative joint disease:  Continue with antiinflammatories. 3.   Constipation:  No new impaction. We will monitor.          Electronically Signed By: Annette Baxter M.D. on 2019 10:27:17  ______________________________  Annette Baxter M.D.  NT/[id number]  D: 2019 21:54:57  T: 2019 14:10:28    cc: Alfreda Blanco

## 2019-04-16 ENCOUNTER — OFFICE VISIT (OUTPATIENT)
Dept: GERIATRIC MEDICINE | Age: 84
End: 2019-04-16
Payer: MEDICARE

## 2019-04-16 DIAGNOSIS — J06.9 ACUTE URI: Primary | ICD-10-CM

## 2019-04-16 PROCEDURE — 99309 SBSQ NF CARE MODERATE MDM 30: CPT | Performed by: NURSE PRACTITIONER

## 2019-04-16 PROCEDURE — 1123F ACP DISCUSS/DSCN MKR DOCD: CPT | Performed by: NURSE PRACTITIONER

## 2019-04-19 VITALS
SYSTOLIC BLOOD PRESSURE: 108 MMHG | RESPIRATION RATE: 18 BRPM | OXYGEN SATURATION: 97 % | HEART RATE: 74 BPM | DIASTOLIC BLOOD PRESSURE: 68 MMHG | TEMPERATURE: 97.7 F

## 2019-04-19 NOTE — PROGRESS NOTES
PATIENT: Ghanshyam Saleem : 10/23/1927 DOS: 2019     [facility]    Mr. Edward Tse is being seen acutely for an upper respiratory infection. Nursing states he had a runny nose, wiping his nose often. He has advanced dementia, but he does speak and make complaints for himself. He says yes he is coughing and his nose is runny. He is not in any pain. There have been no fevers, chills or sweats reported. The patient is on hospice. We do not normally do lot of labs and diagnostics for this patient because we are treating his symptoms and giving him Comfort Care. MEDICATIONS AND ALLERGIES: Reviewed. Past Medical History:   Diagnosis Date    CAD (coronary artery disease)     Dementia without behavioral disturbance 2018    Gout     Hard of hearing     Hypertension     Injury to penis 10/6/2018    Obstructed, uropathy 2018    Osteoarthritis     Senile dementia        PHYSICAL EXAMINATION: He does have a yellow drainage from the nasal area. The nares are full. There is no fullness, tenderness over the sinus of the face. Lungs are clear to auscultation. Voice is hoarse. Oropharynx is slightly reddened, but no purulence is noted. ASSESSMENT AND PLAN: Upper respiratory infection with little bit of cough and congestion. We will order Z-Rajendra and Robitussin round-the-clock.           Electronically Signed By: OSCAR Hurd GNP-BC on 2019 22:53:38  ______________________________  OSCAR Hurd GNP-BC  /[id number]  D: 2019 09:30:57  T: 2019 10:10:31    cc: Alfreda Pizarro

## 2019-05-21 ENCOUNTER — OFFICE VISIT (OUTPATIENT)
Dept: GERIATRIC MEDICINE | Age: 84
End: 2019-05-21
Payer: MEDICARE

## 2019-05-21 DIAGNOSIS — M19.90 ARTHRITIS: ICD-10-CM

## 2019-05-21 DIAGNOSIS — F03.90 DEMENTIA WITHOUT BEHAVIORAL DISTURBANCE, UNSPECIFIED DEMENTIA TYPE: Primary | ICD-10-CM

## 2019-05-21 DIAGNOSIS — K59.01 CONSTIPATION, SLOW TRANSIT: ICD-10-CM

## 2019-05-21 DIAGNOSIS — R27.0 ATAXIA: ICD-10-CM

## 2019-05-21 PROCEDURE — 1123F ACP DISCUSS/DSCN MKR DOCD: CPT | Performed by: NURSE PRACTITIONER

## 2019-05-21 PROCEDURE — 99308 SBSQ NF CARE LOW MDM 20: CPT | Performed by: NURSE PRACTITIONER

## 2019-05-29 VITALS
DIASTOLIC BLOOD PRESSURE: 66 MMHG | RESPIRATION RATE: 17 BRPM | WEIGHT: 159 LBS | SYSTOLIC BLOOD PRESSURE: 106 MMHG | HEART RATE: 70 BPM | TEMPERATURE: 97.9 F | BODY MASS INDEX: 25.66 KG/M2

## 2019-07-18 ENCOUNTER — OFFICE VISIT (OUTPATIENT)
Dept: GERIATRIC MEDICINE | Age: 84
End: 2019-07-18
Payer: MEDICARE

## 2019-07-18 DIAGNOSIS — R27.0 ATAXIA: Primary | ICD-10-CM

## 2019-07-18 DIAGNOSIS — R13.12 OROPHARYNGEAL DYSPHAGIA: ICD-10-CM

## 2019-07-18 DIAGNOSIS — M15.9 PRIMARY OSTEOARTHRITIS INVOLVING MULTIPLE JOINTS: ICD-10-CM

## 2019-07-18 PROCEDURE — 99308 SBSQ NF CARE LOW MDM 20: CPT | Performed by: NURSE PRACTITIONER

## 2019-07-18 PROCEDURE — 1123F ACP DISCUSS/DSCN MKR DOCD: CPT | Performed by: NURSE PRACTITIONER

## 2019-07-19 VITALS
TEMPERATURE: 97.9 F | WEIGHT: 170.8 LBS | HEART RATE: 71 BPM | OXYGEN SATURATION: 98 % | DIASTOLIC BLOOD PRESSURE: 64 MMHG | BODY MASS INDEX: 27.57 KG/M2 | RESPIRATION RATE: 20 BRPM | SYSTOLIC BLOOD PRESSURE: 121 MMHG

## 2019-08-23 ENCOUNTER — OFFICE VISIT (OUTPATIENT)
Dept: GERIATRIC MEDICINE | Age: 84
End: 2019-08-23
Payer: MEDICARE

## 2019-08-23 DIAGNOSIS — F01.518 VASCULAR DEMENTIA WITH BEHAVIOR DISTURBANCE: Primary | ICD-10-CM

## 2019-08-23 DIAGNOSIS — R45.1 AGITATION: ICD-10-CM

## 2019-08-23 PROCEDURE — 99308 SBSQ NF CARE LOW MDM 20: CPT | Performed by: NURSE PRACTITIONER

## 2019-08-23 PROCEDURE — 1123F ACP DISCUSS/DSCN MKR DOCD: CPT | Performed by: NURSE PRACTITIONER

## 2019-09-24 VITALS
BODY MASS INDEX: 28.21 KG/M2 | WEIGHT: 174.8 LBS | RESPIRATION RATE: 18 BRPM | HEART RATE: 71 BPM | DIASTOLIC BLOOD PRESSURE: 76 MMHG | SYSTOLIC BLOOD PRESSURE: 118 MMHG | TEMPERATURE: 97.9 F | OXYGEN SATURATION: 97 %

## 2019-09-25 PROBLEM — F03.918 DEMENTIA WITH BEHAVIORAL DISTURBANCE: Status: ACTIVE | Noted: 2018-07-27

## 2019-09-26 ENCOUNTER — OFFICE VISIT (OUTPATIENT)
Dept: GERIATRIC MEDICINE | Age: 84
End: 2019-09-26
Payer: MEDICARE

## 2019-09-26 DIAGNOSIS — R62.7 ADULT FAILURE TO THRIVE: Primary | ICD-10-CM

## 2019-09-26 DIAGNOSIS — I10 ESSENTIAL HYPERTENSION: ICD-10-CM

## 2019-09-26 DIAGNOSIS — M15.9 OSTEOARTHRITIS OF MULTIPLE JOINTS, UNSPECIFIED OSTEOARTHRITIS TYPE: ICD-10-CM

## 2019-09-26 PROCEDURE — 1123F ACP DISCUSS/DSCN MKR DOCD: CPT | Performed by: INTERNAL MEDICINE

## 2019-09-26 PROCEDURE — 99309 SBSQ NF CARE MODERATE MDM 30: CPT | Performed by: INTERNAL MEDICINE

## 2019-11-05 ENCOUNTER — OFFICE VISIT (OUTPATIENT)
Dept: GERIATRIC MEDICINE | Age: 84
End: 2019-11-05
Payer: MEDICARE

## 2019-11-05 DIAGNOSIS — L25.8 INCONTINENCE ASSOCIATED DERMATITIS: Primary | ICD-10-CM

## 2019-11-05 DIAGNOSIS — N39.46 MIXED STRESS AND URGE URINARY INCONTINENCE: ICD-10-CM

## 2019-11-05 DIAGNOSIS — R32 INCONTINENCE ASSOCIATED DERMATITIS: Primary | ICD-10-CM

## 2019-11-05 DIAGNOSIS — R15.9 FULL INCONTINENCE OF FECES: ICD-10-CM

## 2019-11-05 PROCEDURE — 99308 SBSQ NF CARE LOW MDM 20: CPT | Performed by: NURSE PRACTITIONER

## 2019-11-06 ENCOUNTER — TELEPHONE (OUTPATIENT)
Dept: GERIATRIC MEDICINE | Age: 84
End: 2019-11-06

## 2019-11-13 VITALS
RESPIRATION RATE: 17 BRPM | TEMPERATURE: 98.4 F | BODY MASS INDEX: 28.27 KG/M2 | DIASTOLIC BLOOD PRESSURE: 77 MMHG | OXYGEN SATURATION: 97 % | HEIGHT: 66 IN | WEIGHT: 175.9 LBS | HEART RATE: 72 BPM | SYSTOLIC BLOOD PRESSURE: 112 MMHG

## 2019-11-13 PROBLEM — R32 INCONTINENCE ASSOCIATED DERMATITIS: Status: ACTIVE | Noted: 2019-11-13

## 2019-11-13 PROBLEM — N39.46 MIXED STRESS AND URGE URINARY INCONTINENCE: Status: ACTIVE | Noted: 2019-11-13

## 2019-11-13 PROBLEM — R15.9 FULL INCONTINENCE OF FECES: Status: ACTIVE | Noted: 2019-11-13

## 2019-11-13 PROBLEM — L25.8 INCONTINENCE ASSOCIATED DERMATITIS: Status: ACTIVE | Noted: 2019-11-13

## 2020-01-17 ENCOUNTER — HOSPITAL ENCOUNTER (EMERGENCY)
Age: 85
Discharge: HOME | End: 2020-01-17
Attending: EMERGENCY MEDICINE
Payer: MEDICARE

## 2020-01-17 ENCOUNTER — APPOINTMENT (OUTPATIENT)
Dept: CT IMAGING | Age: 85
End: 2020-01-17
Payer: COMMERCIAL

## 2020-01-17 VITALS
TEMPERATURE: 98.3 F | WEIGHT: 170 LBS | HEART RATE: 78 BPM | RESPIRATION RATE: 16 BRPM | BODY MASS INDEX: 27.32 KG/M2 | OXYGEN SATURATION: 98 % | HEIGHT: 66 IN | SYSTOLIC BLOOD PRESSURE: 115 MMHG | DIASTOLIC BLOOD PRESSURE: 61 MMHG

## 2020-01-17 LAB
ALBUMIN SERPL-MCNC: 3.6 G/DL (ref 3.5–4.6)
ALP BLD-CCNC: 68 U/L (ref 35–104)
ALT SERPL-CCNC: 15 U/L (ref 0–41)
ANION GAP SERPL CALCULATED.3IONS-SCNC: 12 MEQ/L (ref 9–15)
AST SERPL-CCNC: 23 U/L (ref 0–40)
BASOPHILS ABSOLUTE: 0 K/UL (ref 0–0.2)
BASOPHILS RELATIVE PERCENT: 0.5 %
BILIRUB SERPL-MCNC: 0.3 MG/DL (ref 0.2–0.7)
BUN BLDV-MCNC: 29 MG/DL (ref 8–23)
CALCIUM SERPL-MCNC: 9 MG/DL (ref 8.5–9.9)
CHLORIDE BLD-SCNC: 106 MEQ/L (ref 95–107)
CO2: 24 MEQ/L (ref 20–31)
CREAT SERPL-MCNC: 1.38 MG/DL (ref 0.7–1.2)
EOSINOPHILS ABSOLUTE: 0.5 K/UL (ref 0–0.7)
EOSINOPHILS RELATIVE PERCENT: 4.3 %
GFR AFRICAN AMERICAN: 58.3
GFR NON-AFRICAN AMERICAN: 48.2
GLOBULIN: 3.2 G/DL (ref 2.3–3.5)
GLUCOSE BLD-MCNC: 140 MG/DL (ref 70–99)
HCT VFR BLD CALC: 41.6 % (ref 42–52)
HEMOGLOBIN: 14 G/DL (ref 14–18)
INR BLD: 1.1
LYMPHOCYTES ABSOLUTE: 1 K/UL (ref 1–4.8)
LYMPHOCYTES RELATIVE PERCENT: 9.7 %
MCH RBC QN AUTO: 30.1 PG (ref 27–31.3)
MCHC RBC AUTO-ENTMCNC: 33.8 % (ref 33–37)
MCV RBC AUTO: 89.1 FL (ref 80–100)
MONOCYTES ABSOLUTE: 0.8 K/UL (ref 0.2–0.8)
MONOCYTES RELATIVE PERCENT: 7.7 %
NEUTROPHILS ABSOLUTE: 8.3 K/UL (ref 1.4–6.5)
NEUTROPHILS RELATIVE PERCENT: 77.8 %
PDW BLD-RTO: 14.7 % (ref 11.5–14.5)
PLATELET # BLD: 259 K/UL (ref 130–400)
POTASSIUM SERPL-SCNC: 4.2 MEQ/L (ref 3.4–4.9)
PROTHROMBIN TIME: 14.3 SEC (ref 12.3–14.9)
RBC # BLD: 4.67 M/UL (ref 4.7–6.1)
SODIUM BLD-SCNC: 142 MEQ/L (ref 135–144)
TOTAL PROTEIN: 6.8 G/DL (ref 6.3–8)
WBC # BLD: 10.7 K/UL (ref 4.8–10.8)

## 2020-01-17 PROCEDURE — 6360000002 HC RX W HCPCS: Performed by: EMERGENCY MEDICINE

## 2020-01-17 PROCEDURE — 85025 COMPLETE CBC W/AUTO DIFF WBC: CPT

## 2020-01-17 PROCEDURE — 96372 THER/PROPH/DIAG INJ SC/IM: CPT

## 2020-01-17 PROCEDURE — 85610 PROTHROMBIN TIME: CPT

## 2020-01-17 PROCEDURE — 2580000003 HC RX 258: Performed by: EMERGENCY MEDICINE

## 2020-01-17 PROCEDURE — 51702 INSERT TEMP BLADDER CATH: CPT

## 2020-01-17 PROCEDURE — 96365 THER/PROPH/DIAG IV INF INIT: CPT

## 2020-01-17 PROCEDURE — 99284 EMERGENCY DEPT VISIT MOD MDM: CPT

## 2020-01-17 PROCEDURE — 74177 CT ABD & PELVIS W/CONTRAST: CPT

## 2020-01-17 PROCEDURE — 99285 EMERGENCY DEPT VISIT HI MDM: CPT | Performed by: UROLOGY

## 2020-01-17 PROCEDURE — 6370000000 HC RX 637 (ALT 250 FOR IP): Performed by: EMERGENCY MEDICINE

## 2020-01-17 PROCEDURE — 80053 COMPREHEN METABOLIC PANEL: CPT

## 2020-01-17 PROCEDURE — 6360000004 HC RX CONTRAST MEDICATION: Performed by: EMERGENCY MEDICINE

## 2020-01-17 PROCEDURE — 36415 COLL VENOUS BLD VENIPUNCTURE: CPT

## 2020-01-17 RX ORDER — LIDOCAINE HYDROCHLORIDE 20 MG/ML
JELLY TOPICAL PRN
Status: DISCONTINUED | OUTPATIENT
Start: 2020-01-17 | End: 2020-01-17 | Stop reason: HOSPADM

## 2020-01-17 RX ORDER — ONDANSETRON 4 MG/1
4 TABLET, ORALLY DISINTEGRATING ORAL ONCE
Status: COMPLETED | OUTPATIENT
Start: 2020-01-17 | End: 2020-01-17

## 2020-01-17 RX ORDER — CIPROFLOXACIN 500 MG/1
500 TABLET, FILM COATED ORAL 2 TIMES DAILY
Qty: 14 TABLET | Refills: 0 | Status: SHIPPED | OUTPATIENT
Start: 2020-01-17 | End: 2020-01-24

## 2020-01-17 RX ADMIN — ONDANSETRON 4 MG: 4 TABLET, ORALLY DISINTEGRATING ORAL at 07:53

## 2020-01-17 RX ADMIN — HYDROMORPHONE HYDROCHLORIDE 0.5 MG: 1 INJECTION, SOLUTION INTRAMUSCULAR; INTRAVENOUS; SUBCUTANEOUS at 07:52

## 2020-01-17 RX ADMIN — CEFTRIAXONE SODIUM 1 G: 1 INJECTION, POWDER, FOR SOLUTION INTRAMUSCULAR; INTRAVENOUS at 11:45

## 2020-01-17 RX ADMIN — IOPAMIDOL 100 ML: 755 INJECTION, SOLUTION INTRAVENOUS at 10:14

## 2020-01-17 ASSESSMENT — PAIN SCALES - GENERAL
PAINLEVEL_OUTOF10: 0
PAINLEVEL_OUTOF10: 0
PAINLEVEL_OUTOF10: 2
PAINLEVEL_OUTOF10: 8

## 2020-01-17 ASSESSMENT — PAIN DESCRIPTION - PAIN TYPE: TYPE: CHRONIC PAIN

## 2020-01-17 ASSESSMENT — PAIN DESCRIPTION - LOCATION: LOCATION: FOOT

## 2020-01-17 ASSESSMENT — PAIN DESCRIPTION - ORIENTATION: ORIENTATION: LEFT

## 2020-01-17 NOTE — ED NOTES
Pt resting in bed with no signs of distress. Appears to be sleeping; easily aroused.      Nicolette Erwin RN  01/17/20 8658

## 2020-01-17 NOTE — CONSULTS
[address]                      [address], [address] [facility]                                  CONSULTATION    PATIENT NAME: Kevin Atkins                       :        10/23/1927  MED REC NO:   [id number]                            ROOM:       04  ACCOUNT NO:   [de-identified]                           ADMIT DATE: 2020  PROVIDER:     Leticia Wong MD    CONSULT DATE:  2020    ADDENDUM TO EMERGENCY ROOM CONSULTATION    In trying to obtain further history on the patient from his [facility], we were able to contact the nurse practitioner who cares  for the patient, Manny Jimenez, who provided us with more accurate history. Apparently, the patient has not had a King catheter indwelling for over  a year. This catheter was removed in the past because the patient was  having a tendency of pulling the catheter out and causing traumatic  injury related to this and also because of the significant traumatic  hypospadias. Yesterday, apparently, a nurse attempted a straight  catheterization to obtain an urine sample to evaluate for the  possibility of a urinary tract infection and was unable to place the  catheter as she saw some urethral bleeding in the area of the traumatic  hypospadias, and the patient was sent to the emergency room for  evaluation. Of note, on his CAT scan, his bladder is not distended, the  patient does not feel distended, and he has no complaints of pain, and  he actually spontaneously voided mostly clear urine while in the  emergency room per the nurse in the emergency room. At this point,  after discussion with the patient's nurse practitioner at the nursing  home, he has had been on hospice, graduated from hospice, but the plan  was to potentially place him back on hospice because of recent weight  loss and deterioration. Most likely, the patient is going to be  returning to hospice after discussion with his family.   I

## 2020-01-17 NOTE — ED NOTES
Pt medicated with IM dilaudid and PO zofran. Pt tolerated well. Oral mucosa/ tongue dry.        Joann Fairbanks RN  01/17/20 0800

## 2020-01-17 NOTE — ED NOTES
Pt resting in bed with no signs of distress. He rates his pain 5/10 in his penis. Denies any abdominal pain or discomfort. Pt updated on status and plan of care. Agreeable with plan.      Rios Chamberlain RN  01/17/20 6920

## 2020-01-17 NOTE — ED NOTES
Pt cleaned up due to incontinence. Report given to Nasima Ann at [facility]. Vitals stable at this time.        Renato Anders RN  01/17/20 7605

## 2020-01-17 NOTE — ED NOTES
Dr. Amy Galarza at bedside for shi insertion. Pt agitated, but tolerated well. Insertion unsuccessful. Plan is surgery per Dr. Amy Galarza. Dr. Miya Arellano aware. Pt resting in bed with no signs of distress.      Anirudh Saha RN  01/17/20 9119

## 2020-01-17 NOTE — ED NOTES
CNP from [facility] requested to speak to Dr. Vijay Beltran. Numbers were exchanged. Plan is to send pt back to [facility]. Transport set up via [employer]. ETA 1234.      Savanah Avendano RN  01/17/20 3399

## 2020-01-17 NOTE — ED NOTES
Talked to Dr. Taqueria Bass; PT/INR order put in. Lab called to verify they got specimen.      Leonel You RN  01/17/20 0043

## 2020-01-17 NOTE — CONSULTS
[address] [facility]                      [address] [place], [address] [facility]                                  CONSULTATION    PATIENT NAME: Bryson Holm                       :        10/23/1927  MED REC NO:   [id number]                            ROOM:       04  ACCOUNT NO:   [de-identified]                           ADMIT DATE: 2020  PROVIDER:     Josue Sandoval MD    CONSULT DATE:  2020    REASON FOR CONSULTATION:  Inability to replace King catheter. HISTORY OF PRESENT ILLNESS:  This is a 79-year-old male who is a patient  at a nursing home with dementia, coronary artery disease, gout,  osteoarthritis, who apparently has a indwelling King catheter, and  apparently, a nurse was trying to change the catheter because it was  overdue, and when they went to change the catheter, they got bloody  return and felt that there was a possible injury and was sent to the  emergency room for further evaluation. Attempts in the emergency room  to place catheter were unsuccessful. Urologic consultation was  requested. The patient is unable to provide any reliable history  outside of whether he is in pain or not, which he claims he is not in  any pain at this time. He has no abdominal or flank pain nor penile  pain at this time. Per review of the available medical records, there  is no obvious prior urologic surgical history noted. Review of systems  is unable to perform due to dementia. PAST MEDICAL HISTORY:  Per my review of the chart includes coronary  artery disease, dementia, gout, hypertension, traumatic hypospadias,  obstructed uropathy, osteoarthritis, and dementia. PAST SURGICAL HISTORY:  There is nothing noted in the medical record. MEDICATIONS ON ADMISSION:  Include Tylenol, Proventil, aspirin,  Benadryl, Lasix, Remeron, morphine, Klor-Con. ALLERGIES:  He has no known drug allergies.     FAMILY HISTORY:  He has no pertinent family history that is

## 2020-01-17 NOTE — ED PROVIDER NOTES
[address] [facility] ED  eMERGENCY dEPARTMENT eNCOUnter      Pt Name: Princess Mcduffie  MRN: [id number]  [place] 10/23/1927  Date of evaluation: 1/17/2020  Provider: Brittany Rangel MD    CHIEF COMPLAINT       Chief Complaint   Patient presents with    Other     penile meatus split due to catheter. HISTORY OF PRESENT ILLNESS   (Location/Symptom, Timing/Onset,Context/Setting, Quality, Duration, Modifying Factors, Severity)  Note limiting factors. Princess Mcduffie is a 80 y.o. male who presents to the emergency department for evaluation of catheter malfunction. Patient has chronic indwelling King catheter is a resident of a local nursing home. He is unable to give any history. He can answer some simple questions about if he has discomfort but does not recall any of the history of whether he has had a catheter or the events that transpired tonight. Nursing home nurse reports that she was trying to change his catheter because it was overdue and started to look old. As she went to change the catheter she got blood return and felt that there was a possible tearing around the urethral meatus. Patient was sent in here for further evaluation of this. Again, he is unable to comment any of the history and denies pain currently. HPI    NursingNotes were reviewed. REVIEW OF SYSTEMS    (2-9 systems for level 4, 10 or more for level 5)     Review of Systems   Unable to perform ROS: Dementia   All other systems reviewed and are negative. Except as noted above the remainder of the review of systems was reviewed and negative. PAST MEDICAL HISTORY     Past Medical History:   Diagnosis Date    CAD (coronary artery disease)     Dementia without behavioral disturbance ([address] 7/27/2018    Gout     Hard of hearing     Hypertension     Injury to penis 10/6/2018    Obstructed, uropathy 7/27/2018    Osteoarthritis     Senile dementia ([address]          SURGICALHISTORY     History reviewed.  No pertinent surgical history. CURRENT MEDICATIONS       Previous Medications    ACETAMINOPHEN (TYLENOL) 650 MG SUPPOSITORY    Place 650 mg rectally 2 times daily    ALBUTEROL (PROVENTIL) (2.5 MG/3ML) 0.083% NEBULIZER SOLUTION    Take 2.5 mg by nebulization every 4 hours as needed for Wheezing or Shortness of Breath    ASPIRIN 81 MG TABLET    Take 81 mg by mouth daily    DIPHENHYDRAMINE (BENADRYL) 25 MG TABLET    Take 25 mg by mouth every 8 hours as needed for Itching    FUROSEMIDE (LASIX) 40 MG TABLET    Take 60 mg by mouth daily     HYDROCORTISONE 1 % CREAM    Apply topically 2 times daily Apply topically 2 times daily. MIRTAZAPINE (REMERON) 15 MG TABLET    Take 7.5 mg by mouth nightly    MORPHINE SULFATE 20 MG/ML CONCENTRATED ORAL SOLUTION    Take 5 mg by mouth every 2 hours as needed for Pain. Canda Nay POTASSIUM CHLORIDE (KLOR-CON M20) 20 MEQ EXTENDED RELEASE TABLET    Take 20 mEq by mouth daily       ALLERGIES     Patient has no known allergies. FAMILY HISTORY     History reviewed. No pertinent family history. SOCIAL HISTORY       Social History     Socioeconomic History    Marital status:       Spouse name: None    Number of children: None    Years of education: None    Highest education level: None   Occupational History    None   Social Needs    Financial resource strain: None    Food insecurity:     Worry: None     Inability: None    Transportation needs:     Medical: None     Non-medical: None   Tobacco Use    Smoking status: Former Smoker    Smokeless tobacco: Never Used   Substance and Sexual Activity    Alcohol use: Not Currently    Drug use: No    Sexual activity: None   Lifestyle    Physical activity:     Days per week: None     Minutes per session: None    Stress: None   Relationships    Social connections:     Talks on phone: None     Gets together: None     Attends [employer] service: None     Active member of club or organization: None     Attends meetings of clubs or organizations: None     Relationship status: None    Intimate partner violence:     Fear of current or ex partner: None     Emotionally abused: None     Physically abused: None     Forced sexual activity: None   Other Topics Concern    None   Social History Narrative    None       SCREENINGS    [place] Coma Scale  Eye Opening: Spontaneous  Best Verbal Response: Oriented  Best Motor Response: Obeys commands  [place] Coma Scale Score: 15 @FLOW([id number])@      PHYSICAL EXAM    (up to 7 for level 4, 8 or more for level 5)     ED Triage Vitals   BP Temp Temp src Pulse Resp SpO2 Height Weight   -- -- -- -- -- -- -- --       Physical Exam  Vitals signs and nursing note reviewed. Constitutional:       Appearance: He is well-developed. HENT:      Head: Normocephalic. Nose: Nose normal.   Eyes:      Conjunctiva/sclera: Conjunctivae normal.      Pupils: Pupils are equal, round, and reactive to light. Neck:      Musculoskeletal: Normal range of motion and neck supple. Cardiovascular:      Rate and Rhythm: Normal rate and regular rhythm. Heart sounds: Normal heart sounds. Pulmonary:      Effort: Pulmonary effort is normal.      Breath sounds: Normal breath sounds. Abdominal:      General: Bowel sounds are normal.      Palpations: Abdomen is soft. Tenderness: There is no tenderness. There is no guarding. Hernia: There is no hernia in the right inguinal area or left inguinal area. Genitourinary:     Penis: Hypospadias present. Musculoskeletal: Normal range of motion. Skin:     General: Skin is warm and dry. Neurological:      Mental Status: He is alert. Mental status is at baseline.          DIAGNOSTIC RESULTS     EKG: All EKG's are interpreted by the Emergency Department Physician who either signs or Co-signsthis chart in the absence of a cardiologist.        RADIOLOGY:   Non-plain filmimages such as CT, Ultrasound and MRI are read by the radiologist. Plain radiographic images are 95% 96% 93% 96%   Weight:       Height:              MDM we had difficulty placing the patient's catheter by nursing staff with my assistance. We required the urology specialist Dr. Bryn Rueda to come place a catheter. Dr Bryn Rueda came to the bedside and could not place the catheter. Dr. Gina Lee also came to the bedside and could not place the pt's shi catheter. CT AP shows enlarged prostate with possible calcification in the urethra. Labs remarkable for BUn 29, Cr 1.38.  Pt's bladder scan done in the ED and was unremarkable. Case discussed with NP at [facility] who stated that the pt never had a shi catheter. Pt urinates spontaneously in the bed. I discussed the case thoroughly with Dr. Bryn Rueda and the NP from the nursing home who believed that pt was stable to go back to the NH given that bladder scan was unremarkable and pt urinates spontaneously. NP at NH was ok with pt going home. Pt given IV rocephin in the ED for likely UTI given multiple attempts at shi catheter. Pt given prescription for cipro and will f/u with pcp and urology. Pt understands plan. CONSULTS:  None    PROCEDURES:  Unless otherwise noted below, none     Procedures    FINAL IMPRESSION      1. Encounter for Shi catheter replacement    2. Acute cystitis without hematuria          DISPOSITION/PLAN   DISPOSITION Decision To Discharge 01/17/2020 11:32:19 AM      PATIENT REFERRED TO:  Anastacio Montoya [id number] [phone]    In 3 days      Deven Obrien MD  [address] [place] [id number] [phone]            DISCHARGE MEDICATIONS:  New Prescriptions    CIPROFLOXACIN (CIPRO) 500 MG TABLET    Take 1 tablet by mouth 2 times daily for 7 days          (Please note that portions of this note were completed with a voice recognition program.  Efforts were made to edit the dictations but occasionally words are mis-transcribed.)    Dinora Pisano MD (electronically signed)  Attending Emergency Physician

## 2020-01-17 NOTE — ED NOTES
Dr. Tawanda Charlton had asked me to call [facility] to determine who signs consents for this pt. Spoke to nurse, and she had informed me that pt was not sent over for catheter insertion. She stated a nurse tried to straight cath pt this morning and made the split in the penis worse, and that is why they sent the pt over. Dr. Tawanda Charlton and Dr. Abundio Rdz notified.      Maurice Gutierrez RN  01/17/20 [id number]

## 2020-01-17 NOTE — ED NOTES
Insertion of shi attempted x 2 by this RN. Attempt made by another RN and CNP with unsuccessful attempt. Provide aware.      Bhavik Yin RN  01/17/20 [id number]

## 2020-01-17 NOTE — ED NOTES
Pt cleaned up and changed/repositioned. Multiple stage 1 pressure ulcers noted to scrotum. Abdomen distended; no urine noted in brief since 0700.      Aleksandr Hagen RN  01/17/20 1120

## 2020-01-18 LAB
GFR AFRICAN AMERICAN: 57
GFR NON-AFRICAN AMERICAN: 47
PERFORMED ON: ABNORMAL
POC CREATININE: 1.4 MG/DL (ref 0.8–1.3)
POC SAMPLE TYPE: ABNORMAL

## 2020-02-21 LAB
ALBUMIN SERPL-MCNC: 3.5 G/DL (ref 3.5–4.6)
ALP BLD-CCNC: 54 U/L (ref 35–104)
ALT SERPL-CCNC: 8 U/L (ref 0–41)
ANION GAP SERPL CALCULATED.3IONS-SCNC: 12 MEQ/L (ref 9–15)
AST SERPL-CCNC: 14 U/L (ref 0–40)
BASOPHILS ABSOLUTE: 0 K/UL (ref 0–0.2)
BASOPHILS RELATIVE PERCENT: 0.4 %
BILIRUB SERPL-MCNC: <0.2 MG/DL (ref 0.2–0.7)
BUN BLDV-MCNC: 26 MG/DL (ref 8–23)
CALCIUM SERPL-MCNC: 8.5 MG/DL (ref 8.5–9.9)
CHLORIDE BLD-SCNC: 108 MEQ/L (ref 95–107)
CO2: 22 MEQ/L (ref 20–31)
CREAT SERPL-MCNC: 1.2 MG/DL (ref 0.7–1.2)
EOSINOPHILS ABSOLUTE: 1.8 K/UL (ref 0–0.7)
EOSINOPHILS RELATIVE PERCENT: 24.1 %
GFR AFRICAN AMERICAN: >60
GFR NON-AFRICAN AMERICAN: 56.6
GLOBULIN: 2.4 G/DL (ref 2.3–3.5)
GLUCOSE BLD-MCNC: 87 MG/DL (ref 70–99)
HCT VFR BLD CALC: 38.6 % (ref 42–52)
HEMOGLOBIN: 13 G/DL (ref 14–18)
LYMPHOCYTES ABSOLUTE: 1.7 K/UL (ref 1–4.8)
LYMPHOCYTES RELATIVE PERCENT: 23.2 %
MCH RBC QN AUTO: 29.8 PG (ref 27–31.3)
MCHC RBC AUTO-ENTMCNC: 33.6 % (ref 33–37)
MCV RBC AUTO: 88.6 FL (ref 80–100)
MONOCYTES ABSOLUTE: 0.7 K/UL (ref 0.2–0.8)
MONOCYTES RELATIVE PERCENT: 9.6 %
NEUTROPHILS ABSOLUTE: 3.2 K/UL (ref 1.4–6.5)
NEUTROPHILS RELATIVE PERCENT: 42.7 %
PDW BLD-RTO: 15.6 % (ref 11.5–14.5)
PLATELET # BLD: 189 K/UL (ref 130–400)
POTASSIUM SERPL-SCNC: 4.2 MEQ/L (ref 3.4–4.9)
PREALBUMIN: 23.3 MG/DL (ref 20–40)
RBC # BLD: 4.36 M/UL (ref 4.7–6.1)
SODIUM BLD-SCNC: 142 MEQ/L (ref 135–144)
TOTAL PROTEIN: 5.9 G/DL (ref 6.3–8)
WBC # BLD: 7.4 K/UL (ref 4.8–10.8)

## 2020-02-27 ENCOUNTER — OFFICE VISIT (OUTPATIENT)
Dept: GERIATRIC MEDICINE | Age: 85
End: 2020-02-27
Payer: MEDICARE

## 2020-02-27 PROCEDURE — 1123F ACP DISCUSS/DSCN MKR DOCD: CPT | Performed by: INTERNAL MEDICINE

## 2020-02-27 PROCEDURE — 99308 SBSQ NF CARE LOW MDM 20: CPT | Performed by: INTERNAL MEDICINE

## 2020-02-27 PROCEDURE — G8484 FLU IMMUNIZE NO ADMIN: HCPCS | Performed by: INTERNAL MEDICINE

## 2020-04-21 ENCOUNTER — VIRTUAL VISIT (OUTPATIENT)
Dept: GERIATRIC MEDICINE | Age: 85
End: 2020-04-21
Payer: MEDICARE

## 2020-04-21 PROCEDURE — 99309 SBSQ NF CARE MODERATE MDM 30: CPT | Performed by: NURSE PRACTITIONER

## 2020-04-21 PROCEDURE — 1123F ACP DISCUSS/DSCN MKR DOCD: CPT | Performed by: NURSE PRACTITIONER

## 2020-04-23 VITALS
TEMPERATURE: 97.5 F | DIASTOLIC BLOOD PRESSURE: 64 MMHG | OXYGEN SATURATION: 97 % | HEART RATE: 80 BPM | RESPIRATION RATE: 17 BRPM | SYSTOLIC BLOOD PRESSURE: 120 MMHG

## 2020-04-23 PROBLEM — M15.9 PRIMARY OSTEOARTHRITIS INVOLVING MULTIPLE JOINTS: Status: ACTIVE | Noted: 2020-04-23

## 2020-04-23 NOTE — PROGRESS NOTES
Name: [address]: [address] [facility]  [address]   Eugenio weber  4/21/2020    TELEHEALTH EVALUATION -- Audio/Visual (During AWFSJ-72 public health emergency)    Due to [place] 19 outbreak, patient's office visit was converted to a virtual visit. Patient was contacted and agreed to proceed with a virtual visit via \"Doxy. me. \"  The risks and benefits of converting to a virtual visit were discussed in light of the current infectious disease epidemic. Patient also understood that insurance coverage and co-pays are up to their individual insurance plans. Chief Complaint   Patient presents with    1 Month Follow-Up       HPI:  Jossue Koenig is a 80 y.o. male being seen today for an audio/video evaluation for the following concern(s):     Diagnosis Orders   1. Vascular dementia with behavior disturbance ([address]     2. Essential hypertension     3. Primary osteoarthritis involving multiple joints     4. Mixed stress and urge urinary incontinence       Resident is being seen for 1 month follow-up evaluation. He has a history of vascular dementia with behavioral disturbance, hypertension, osteoarthritis of multiple joints, incontinence, obstructive uropathy. He is currently sitting up in his wheelchair, in his room, with surgical mask around his ears but under his neck. He propels through the nursing home hallways in the wheelchair, nurses encouraged him to wear a mask while at about the COVID virus, does not understand secondary to dementia, always places mass under his neck. He recognizes me on the video conference and agrees to this video evaluation. Nursing staff report that he has not had a fever, no shortness of breath, occasional cough especially when eating secondary to dysphasia. Appetite is good. Asked if he is having any pain he says no, denies diarrhea, denies shortness of breath, denies nausea or vomiting. Vital signs have been within acceptable range, no fever.   Dementia symptoms and behaviors are at baseline on current medications. He is on diaper dermatitis secondary to incontinence, skin intact at this time. Chronic conditions are stable at this time. Review of Systems Pertinent positives as noted in HPI. Allergies:  Reviewed in nursing facility records via point click care  Medications:  Reviewed and reconciled in nursing facility records via point click care      PHYSICAL EXAMINATION:  [ INSTRUCTIONS:  \"[x]\" Indicates a positive item  \"[]\" Indicates a negative item  -- DELETE ALL ITEMS NOT EXAMINED]    /64   Pulse 80   Temp 97.5 °F (36.4 °C)   Resp 17   SpO2 97%       [x] Alert  [x] Oriented to person  [x] No apparent distress  [] Toxic appearing    [] Face flushed appearing [x] Sclera clear  [] Lips are cyanotic      [x] Breathing appears normal  [] Appears tachypneic      [] Rash on visible skin    [] Cranial Nerves II-XII grossly intact    [x] Motor grossly intact in visible upper extremities    [x] Motor grossly intact in visible lower extremities    [x] Normal Mood  [] Anxious appearing    [] Depressed appearing  [] Confused appearing      [x] Poor short term memory  [] Poor long term memory    [x] OTHER:      Physical Exam Constitutional:  up in wheelchair, well-developed, elderly, frail  ENT:  conjunctive/lids normal, MMM, no runny nose  Pulmonary/Chest:  breathing unlabored, chest excursion symmetrical, no use of accessory muscles, no shortness of breath, no dyspnea.  Lung sounds clear and diminished per nursing staff  Cardiovascular:  heart rate normal per point click care vital signs, no edema  Abd/GI:  abdomen, round, non-distended, positive bowel sounds per nursing staff  MS/Extremities:  SHAH, ROM limited, no clubbing, no cyanosis  Psych:  A/O x 1, cooperative with care at this time, no anxiety, appropriate mood and affect,   Skin:  color normal, no lesions, no rashes      Due to this being a TeleHealth encounter, evaluation of the following

## 2020-05-14 ENCOUNTER — OFFICE VISIT (OUTPATIENT)
Dept: GERIATRIC MEDICINE | Age: 85
End: 2020-05-14
Payer: MEDICARE

## 2020-05-14 PROCEDURE — 1123F ACP DISCUSS/DSCN MKR DOCD: CPT | Performed by: INTERNAL MEDICINE

## 2020-05-14 PROCEDURE — 99309 SBSQ NF CARE MODERATE MDM 30: CPT | Performed by: INTERNAL MEDICINE

## 2020-06-05 ENCOUNTER — VIRTUAL VISIT (OUTPATIENT)
Dept: GERIATRIC MEDICINE | Age: 85
End: 2020-06-05
Payer: MEDICARE

## 2020-06-05 PROCEDURE — 1123F ACP DISCUSS/DSCN MKR DOCD: CPT | Performed by: NURSE PRACTITIONER

## 2020-06-05 PROCEDURE — 99308 SBSQ NF CARE LOW MDM 20: CPT | Performed by: NURSE PRACTITIONER

## 2020-06-05 RX ORDER — LORAZEPAM 0.5 MG/1
0.5 TABLET ORAL 2 TIMES DAILY
Qty: 60 TABLET | Refills: 0 | Status: SHIPPED | OUTPATIENT
Start: 2020-06-05 | End: 2020-07-01 | Stop reason: SDUPTHER

## 2020-06-14 ASSESSMENT — ENCOUNTER SYMPTOMS
DIARRHEA: 0
COUGH: 0
CONSTIPATION: 0
BACK PAIN: 0

## 2020-06-14 NOTE — PROGRESS NOTES
Take 60 mg by mouth daily       mirtazapine (REMERON) 15 MG tablet Take 7.5 mg by mouth nightly      acetaminophen (TYLENOL) 650 MG suppository Place 650 mg rectally 2 times daily      albuterol (PROVENTIL) (2.5 MG/3ML) 0.083% nebulizer solution Take 2.5 mg by nebulization every 4 hours as needed for Wheezing or Shortness of Breath      aspirin 81 MG tablet Take 81 mg by mouth daily       No current facility-administered medications on file prior to visit. No Known Allergies      No family history on file. Physical Exam:      Physical Exam   Constitutional: He appears well-developed and well-nourished. HENT:   Head: Normocephalic and atraumatic. Eyes: EOM are normal.   Neck: Normal range of motion. Neck supple. Cardiovascular: Normal rate and regular rhythm. Pulmonary/Chest: Effort normal and breath sounds normal. He has no wheezes. Abdominal: Soft. Bowel sounds are normal. He exhibits no distension. There is no abdominal tenderness. There is no rebound. Musculoskeletal: Normal range of motion. Neurological: He is alert. Skin: Skin is warm and dry. No rash noted. Psychiatric: He has a normal mood and affect. Nursing note and vitals reviewed.     .   Lab Results   Component Value Date    WBC 7.4 02/21/2020    HGB 13.0 (L) 02/21/2020    HCT 38.6 (L) 02/21/2020    MCV 88.6 02/21/2020     02/21/2020     Lab Results   Component Value Date     02/21/2020    K 4.2 02/21/2020     02/21/2020    CO2 22 02/21/2020    BUN 26 02/21/2020    CREATININE 1.20 02/21/2020    GLUCOSE 87 02/21/2020    CALCIUM 8.5 02/21/2020        ASSESSMENT:  Patient Active Problem List   Diagnosis    Essential hypertension    Obstructed, uropathy    Dementia with [facility] disturbance [address]    Idiopathic gout    Injury to penis    CAD (coronary artery disease)    Full incontinence of feces    Mixed stress and urge urinary incontinence    Incontinence associated dermatitis    Primary

## 2020-06-19 ENCOUNTER — OFFICE VISIT (OUTPATIENT)
Dept: GERIATRIC MEDICINE | Age: 85
End: 2020-06-19
Payer: MEDICARE

## 2020-06-19 PROCEDURE — 1123F ACP DISCUSS/DSCN MKR DOCD: CPT | Performed by: NURSE PRACTITIONER

## 2020-06-19 PROCEDURE — 99308 SBSQ NF CARE LOW MDM 20: CPT | Performed by: NURSE PRACTITIONER

## 2020-06-28 PROBLEM — F41.9 ANXIETY: Status: ACTIVE | Noted: 2020-06-28

## 2020-06-28 PROBLEM — R45.1 AGITATION: Status: ACTIVE | Noted: 2020-06-28

## 2020-06-28 NOTE — PROGRESS NOTES
He has had falls, falls interventions are in place. Review of Systems Pertinent positives as noted in HPI. Allergies:  Reviewed in nursing facility records via point click care  Medications:  Reviewed and reconciled in nursing facility records via point click care      PHYSICAL EXAMINATION:  [ INSTRUCTIONS:  \"[x]\" Indicates a positive item  \"[]\" Indicates a negative item  -- DELETE ALL ITEMS NOT EXAMINED]    There were no vitals taken for this visit. [x] Alert  [] Oriented to person   [x] No apparent distress  [] Toxic appearing    [] Face flushed appearing [x] Sclera clear  [] Lips are cyanotic      [x] Breathing appears normal  [] Appears tachypneic      [] Rash on visible skin    [] Cranial Nerves II-XII grossly intact    [x] Motor grossly intact in visible upper extremities    [x] Motor grossly intact in visible lower extremities    [] Normal Mood  [] Anxious appearing    [] Depressed appearing  [x] Confused appearing      [x] Poor short term memory  [x] Poor long term memory    [x] OTHER:      Physical Exam Constitutional:  up in wheelchair, elderly, frail  ENT:  conjunctive/lids normal, MMM, chronic runny nose  Pulmonary/Chest:  breathing unlabored, chest excursion symmetrical, no use of accessory muscles, no shortness of breath, no dyspnea.  Lung sounds diminished per nursing staff  Cardiovascular:  heart rate normal per point click care vital signs, no edema  Abd/GI:  abdomen, round, non-distended, positive bowel sounds per nursing staff  MS/Extremities:  SHAH, ROM limited, no clubbing, no cyanosis, self propels in wheelchair  Psych:  A/O x 1, cooperative with care at times but does resists care and becomes very agitated and anxious, inappropriate behaviors at times, requiring frequent redirection d/t dementia.,   Skin:  color normal, no lesions, no rashes, incontinent of Bowel and Bladder    Due to this being a TeleHealth encounter, evaluation of the following organ systems is limited: Vitals/Constitutional/EENT/Resp/CV/GI//MS/Neuro/Skin/Heme-Lymph-Imm. ASSESSMENT/PLAN:  1. Vascular dementia with behavior disturbance ([facility]  Not currently on any dementia medications, staff attempt to redirect and provide 1:1 observation when behaviors escalate. Sitting up in the wheelchair, comfortable in no acute distress, no behaviors at this time. 2. Agitation / Anxiety  Continue Ativan 0.5 mg p.o. twice daily, prescription written. An  electronic signature was used to authenticate this note. --CARLENE Mcgarry CNP on 6/28/2020 at 4:06 PM    Reviewed labs:  Yes    19}  Pursuant to the emergency declaration under the [facility], [facility] waiver authority and the [employer] and [employer] Act, this Virtual  Visit was conducted, with patient's consent, to reduce the patient's risk of exposure to COVID-19 and provide continuity of care for an established patient. Services were provided through a video synchronous discussion virtually to substitute for in-person clinic visit.

## 2020-06-29 RX ORDER — LORAZEPAM 0.5 MG/1
0.5 TABLET ORAL 2 TIMES DAILY
Qty: 60 TABLET | Refills: 0 | Status: CANCELLED | OUTPATIENT
Start: 2020-06-29 | End: 2020-07-29

## 2020-06-30 VITALS
SYSTOLIC BLOOD PRESSURE: 108 MMHG | TEMPERATURE: 97.4 F | HEART RATE: 71 BPM | WEIGHT: 150.4 LBS | DIASTOLIC BLOOD PRESSURE: 68 MMHG | RESPIRATION RATE: 18 BRPM | OXYGEN SATURATION: 98 % | BODY MASS INDEX: 24.28 KG/M2

## 2020-06-30 PROBLEM — F01.518 VASCULAR DEMENTIA WITH BEHAVIOR DISTURBANCE: Status: ACTIVE | Noted: 2018-07-27

## 2020-06-30 NOTE — PROGRESS NOTES
Name: [address]: [address] [facility]  [address]  Eugenio Wallace  6/19/2020      Chief Complaint   Patient presents with    1 Month Follow-Up       HPI:  Tracie Fernández is a 80 y.o. male being seen today for 1 month follow-up of chronic conditions. Resident is up in wheelchair, well-developed, no acute distress. He has history of a dementia with behaviors, essential hypertension, mixed stress and urge incontinence, obstructive uropathy, anxiety, agitation, osteoarthritis notable joints. He is wearing surgical mask due to COVID. He continues to self propel in wheelchair through the hallways in the nursing home, occasionally has some behaviors associated with going inpatient rooms uninvited. Nursing staff try to redirect and provide one-to-one care when he is becoming agitated but he does not understand secondary to dementia. Unable to obtain subjective data or information secondary to his cognitive impairment. He is able to make needs known by pointing or saying yes no to simple questions. Nursing staff report that he has not had any fever no shortness of breath because membranes are moist he does have an occasional cough appetite is good. Vital signs are stable. Behaviors and dementia symptoms are at baseline today. Chronic conditions are stable at this time. He has incontinence of bowel and bladder, skin is intact. Past Medical History:   Diagnosis Date    CAD (coronary artery disease)     Dementia without behavioral disturbance ([facility] [address] 7/27/2018    Gout     Hard of hearing     Hypertension     Injury to penis 10/6/2018    Obstructed, uropathy 7/27/2018    Osteoarthritis     Senile dementia ([facility] [address]      Review of Systems Pertinent positives as noted in HPI.        Allergies:  Reviewed in nursing facility records via point click care  Medications:  Reviewed and reconciled in nursing facility records via point click care    PHYSICAL EXAMINATION:    /68 Pulse 71   Temp 97.4 °F (36.3 °C)   Resp 18   Wt 150 lb 6.4 oz (68.2 kg)   SpO2 98%   BMI 24.28 kg/m²       Physical Exam Constitutional:  up in wheelchair, well-developed, elderly, frail  ENT:  conjunctive/lids normal, MMM, chronic runny nose  Pulmonary/Chest:  breathing unlabored, chest excursion symmetrical, no use of accessory muscles, no shortness of breath, no dyspnea. Lung sounds clear and diminished  Cardiovascular:  heart rate normal, no edema  Abd/GI:  abdomen, round, non-distended, positive bowel sounds per nursing staff  MS/Extremities:  SHAH, ROM limited, no clubbing, no cyanosis  Psych:  A/O x 1, cooperative with care at this time, no anxiety, appropriate mood and affect,   Skin:  color normal, no lesions, no rashes     Diagnosis Orders   1. Vascular dementia with behavior disturbance ([facility] [address]     2. Agitation     3. Essential hypertension     4. Mixed stress and urge urinary incontinence           ASSESSMENT/PLAN:  1. Vascular dementia with behavior disturbance ([facility] [address] [facility]  Dementia and behaviors are at baseline on current medications. Continue medications as ordered. Continue PRN Ativan for behaviors. 2. Essential hypertension  Blood pressure within acceptable range. Continue medications as ordered. 3. Primary osteoarthritis involving multiple joints  When asked if he is having pain he said \"no. \"  Can have PRN Tylenol or PRN morphine sulfate if necessary. 4. Mixed stress and urge urinary incontinence  Incontinent of both bowel and bladder, occasional diaper dermatitis, skin intact at this time. Continue barrier cream as needed. An  electronic signature was used to authenticate this note. --CARLENE Garcia CNP on 6/30/2020 at 6:59 PM    Reviewed labs:  Yes    I have reviewed the patient's medical history in detail and updated the computerized patient record.     This note was partially generated using Dragon voice recognition system, and there may be some incorrect words, spellings, punctuation that were not noticed in checking the note before saving.

## 2020-07-02 ENCOUNTER — OFFICE VISIT (OUTPATIENT)
Dept: GERIATRIC MEDICINE | Age: 85
End: 2020-07-02
Payer: MEDICARE

## 2020-07-02 PROCEDURE — 1123F ACP DISCUSS/DSCN MKR DOCD: CPT | Performed by: INTERNAL MEDICINE

## 2020-07-02 PROCEDURE — 99309 SBSQ NF CARE MODERATE MDM 30: CPT | Performed by: INTERNAL MEDICINE

## 2020-07-02 RX ORDER — LORAZEPAM 0.5 MG/1
0.5 TABLET ORAL 2 TIMES DAILY
Qty: 60 TABLET | Refills: 0 | Status: SHIPPED | OUTPATIENT
Start: 2020-07-02 | End: 2020-07-28 | Stop reason: SDUPTHER

## 2020-07-29 RX ORDER — LORAZEPAM 0.5 MG/1
0.5 TABLET ORAL 2 TIMES DAILY
Qty: 60 TABLET | Refills: 0 | Status: SHIPPED | OUTPATIENT
Start: 2020-07-29 | End: 2020-10-20 | Stop reason: SDUPTHER

## 2020-07-30 NOTE — PROGRESS NOTES
Patient Name: Michi Minaya  Date: 7/30/2020  YOB: 1927  Medical Record Number: [id number]              History of Present Illness:      Review of Systems    Review of Systems: All 14 review of systems negative other than as stated above    Social History     Tobacco Use    Smoking status: Former Smoker    Smokeless tobacco: Never Used   Substance Use Topics    Alcohol use: Not Currently    Drug use: No         Past Medical History:   Diagnosis Date    CAD (coronary artery disease)     Dementia without [facility] disturbance ([facility] [address] 7/27/2018    Gout     Hard of hearing     Hypertension     Injury to penis 10/6/2018    Obstructed, uropathy 7/27/2018    Osteoarthritis     Senile dementia ([facility] [address]            No past surgical history on file. Current Outpatient Medications on File Prior to Visit   Medication Sig Dispense Refill    morphine sulfate 20 MG/ML concentrated oral solution Take 5 mg by mouth every 2 hours as needed for Pain. Genetta Dieter diphenhydrAMINE (BENADRYL) 25 MG tablet Take 25 mg by mouth every 8 hours as needed for Itching      hydrocortisone 1 % cream Apply topically 2 times daily Apply topically 2 times daily.  potassium chloride (KLOR-CON M20) 20 MEQ extended release tablet Take 20 mEq by mouth daily      furosemide (LASIX) 40 MG tablet Take 60 mg by mouth daily       mirtazapine (REMERON) 15 MG tablet Take 7.5 mg by mouth nightly      acetaminophen (TYLENOL) 650 MG suppository Place 650 mg rectally 2 times daily      albuterol (PROVENTIL) (2.5 MG/3ML) 0.083% nebulizer solution Take 2.5 mg by nebulization every 4 hours as needed for Wheezing or Shortness of Breath      aspirin 81 MG tablet Take 81 mg by mouth daily       No current facility-administered medications on file prior to visit. No Known Allergies      No family history on file. Physical Exam:      Physical Exam    There were no vitals taken for this visit.       .   Lab Results   Component Value Date    WBC 7.4 02/21/2020    HGB 13.0 (L) 02/21/2020    HCT 38.6 (L) 02/21/2020    MCV 88.6 02/21/2020     02/21/2020     Lab Results   Component Value Date     02/21/2020    K 4.2 02/21/2020     02/21/2020    CO2 22 02/21/2020    BUN 26 02/21/2020    CREATININE 1.20 02/21/2020    GLUCOSE 87 02/21/2020    CALCIUM 8.5 02/21/2020                ASSESSMENT:  Patient Active Problem List   Diagnosis    Essential hypertension    Obstructed, uropathy    Vascular dementia with behavior disturbance (HCC)    Idiopathic gout    Injury to penis    CAD (coronary artery disease)    Full incontinence of feces    Mixed stress and urge urinary incontinence    Incontinence associated dermatitis    Primary osteoarthritis involving multiple joints    Agitation    Anxiety         PLAN:

## 2020-08-07 LAB
ANION GAP SERPL CALCULATED.3IONS-SCNC: 12 MEQ/L (ref 9–15)
BASOPHILS ABSOLUTE: 0 K/UL (ref 0–0.2)
BASOPHILS RELATIVE PERCENT: 0.4 %
BUN BLDV-MCNC: 23 MG/DL (ref 8–23)
CALCIUM SERPL-MCNC: 8.9 MG/DL (ref 8.5–9.9)
CHLORIDE BLD-SCNC: 104 MEQ/L (ref 95–107)
CO2: 22 MEQ/L (ref 20–31)
CREAT SERPL-MCNC: 1.11 MG/DL (ref 0.7–1.2)
EOSINOPHILS ABSOLUTE: 0.5 K/UL (ref 0–0.7)
EOSINOPHILS RELATIVE PERCENT: 5.5 %
GFR AFRICAN AMERICAN: >60
GFR NON-AFRICAN AMERICAN: >60
GLUCOSE BLD-MCNC: 105 MG/DL (ref 70–99)
HCT VFR BLD CALC: 40.6 % (ref 42–52)
HEMOGLOBIN: 13.9 G/DL (ref 14–18)
LYMPHOCYTES ABSOLUTE: 1.3 K/UL (ref 1–4.8)
LYMPHOCYTES RELATIVE PERCENT: 13.3 %
MCH RBC QN AUTO: 31 PG (ref 27–31.3)
MCHC RBC AUTO-ENTMCNC: 34.1 % (ref 33–37)
MCV RBC AUTO: 90.8 FL (ref 80–100)
MONOCYTES ABSOLUTE: 0.9 K/UL (ref 0.2–0.8)
MONOCYTES RELATIVE PERCENT: 9 %
NEUTROPHILS ABSOLUTE: 6.9 K/UL (ref 1.4–6.5)
NEUTROPHILS RELATIVE PERCENT: 71.8 %
PDW BLD-RTO: 14.6 % (ref 11.5–14.5)
PLATELET # BLD: 234 K/UL (ref 130–400)
POTASSIUM SERPL-SCNC: 4.3 MEQ/L (ref 3.4–4.9)
RBC # BLD: 4.47 M/UL (ref 4.7–6.1)
SODIUM BLD-SCNC: 138 MEQ/L (ref 135–144)
WBC # BLD: 9.6 K/UL (ref 4.8–10.8)

## 2020-08-12 ENCOUNTER — OFFICE VISIT (OUTPATIENT)
Dept: GERIATRIC MEDICINE | Age: 85
End: 2020-08-12
Payer: MEDICARE

## 2020-08-12 PROCEDURE — 99307 SBSQ NF CARE SF MDM 10: CPT | Performed by: NURSE PRACTITIONER

## 2020-08-12 PROCEDURE — 1123F ACP DISCUSS/DSCN MKR DOCD: CPT | Performed by: NURSE PRACTITIONER

## 2020-08-18 ENCOUNTER — OFFICE VISIT (OUTPATIENT)
Dept: GERIATRIC MEDICINE | Age: 85
End: 2020-08-18
Payer: MEDICARE

## 2020-08-18 PROCEDURE — 1123F ACP DISCUSS/DSCN MKR DOCD: CPT | Performed by: NURSE PRACTITIONER

## 2020-08-18 PROCEDURE — 99308 SBSQ NF CARE LOW MDM 20: CPT | Performed by: NURSE PRACTITIONER

## 2020-08-25 ENCOUNTER — OFFICE VISIT (OUTPATIENT)
Dept: GERIATRIC MEDICINE | Age: 85
End: 2020-08-25
Payer: MEDICARE

## 2020-08-25 PROCEDURE — 99308 SBSQ NF CARE LOW MDM 20: CPT | Performed by: NURSE PRACTITIONER

## 2020-08-25 PROCEDURE — 1123F ACP DISCUSS/DSCN MKR DOCD: CPT | Performed by: NURSE PRACTITIONER

## 2020-08-31 VITALS
OXYGEN SATURATION: 96 % | RESPIRATION RATE: 18 BRPM | HEART RATE: 70 BPM | TEMPERATURE: 97.8 F | SYSTOLIC BLOOD PRESSURE: 108 MMHG | DIASTOLIC BLOOD PRESSURE: 65 MMHG

## 2020-08-31 PROBLEM — R13.12 OROPHARYNGEAL DYSPHAGIA: Status: ACTIVE | Noted: 2020-08-31

## 2020-08-31 LAB
ANION GAP SERPL CALCULATED.3IONS-SCNC: 12 MEQ/L (ref 9–15)
BUN BLDV-MCNC: 29 MG/DL (ref 8–23)
CALCIUM SERPL-MCNC: 8.9 MG/DL (ref 8.5–9.9)
CHLORIDE BLD-SCNC: 105 MEQ/L (ref 95–107)
CO2: 21 MEQ/L (ref 20–31)
CREAT SERPL-MCNC: 1.26 MG/DL (ref 0.7–1.2)
GFR AFRICAN AMERICAN: >60
GFR NON-AFRICAN AMERICAN: 53.4
GLUCOSE BLD-MCNC: 109 MG/DL (ref 70–99)
HCT VFR BLD CALC: 40.6 % (ref 42–52)
HEMOGLOBIN: 13.7 G/DL (ref 14–18)
MCH RBC QN AUTO: 30.7 PG (ref 27–31.3)
MCHC RBC AUTO-ENTMCNC: 33.7 % (ref 33–37)
MCV RBC AUTO: 91.3 FL (ref 80–100)
PDW BLD-RTO: 14.5 % (ref 11.5–14.5)
PLATELET # BLD: 238 K/UL (ref 130–400)
POTASSIUM SERPL-SCNC: 4.1 MEQ/L (ref 3.4–4.9)
RBC # BLD: 4.45 M/UL (ref 4.7–6.1)
SODIUM BLD-SCNC: 138 MEQ/L (ref 135–144)
WBC # BLD: 9.6 K/UL (ref 4.8–10.8)

## 2020-08-31 NOTE — PROGRESS NOTES
breath, no dyspnea  Cardiovascular:  S1-S2 regular, no murmur, 2+ DP x 2, no edema  Abd/GI:  abdomen soft, round, non-distended, non-tender, no masses, active bowel sounds x 4 quadrants  MS/Extremities:  SHAH, ROM limited, abnormal gait, no clubbing, no cyanosis  Psych:  A/O x 1, cooperative with care, anxiety at times, mood and affect at baseline  Skin:  warm and dry, color normal, no lesions, no rashes, bruises to extremities, he does run into things while self-propelling in wheelchair     Diagnosis Orders   1. Vascular dementia with behavior disturbance ([facility] [address]     2. Essential hypertension     3. Agitation     4. Oropharyngeal dysphagia         Assessment and Plan:      1. Vascular dementia with behavior disturbance (HCC)  Symptoms are at baseline. Continue meds as ordered. 2. Essential hypertension  Pressure within acceptable range, continue medications as ordered. Not on any antihypertensive agent at this time. On Lasix, baby aspirin and potassium supplement, continue as ordered. 3. Agitation  On Ativan, continue as ordered. 4. Oropharyngeal dysphagia  Continue regular diet with soft foods, boost, Magic cup. Reviewed labs:  Yes    I have reviewed the patient's medical history in detail and updated the computerized patient record. This note was partially generated using Dragon voice recognition system, and there may be some incorrect words, spellings, punctuation that were not noticed in checking the note before saving.     Anup Camp, CARLENE-CNP

## 2020-09-01 LAB
BACTERIA: ABNORMAL /HPF
BILIRUBIN URINE: NEGATIVE
BLOOD, URINE: ABNORMAL
CLARITY: ABNORMAL
COLOR: YELLOW
EPITHELIAL CELLS, UA: ABNORMAL /HPF (ref 0–5)
GLUCOSE URINE: NEGATIVE MG/DL
HYALINE CASTS: ABNORMAL /HPF (ref 0–5)
KETONES, URINE: NEGATIVE MG/DL
LEUKOCYTE ESTERASE, URINE: ABNORMAL
NITRITE, URINE: POSITIVE
PH UA: 5 (ref 5–9)
PROTEIN UA: NEGATIVE MG/DL
RBC UA: ABNORMAL /HPF (ref 0–2)
SPECIFIC GRAVITY UA: 1.01 (ref 1–1.03)
UROBILINOGEN, URINE: 0.2 E.U./DL
WBC UA: >100 /HPF (ref 0–5)

## 2020-09-01 NOTE — PROGRESS NOTES
Name: [address]: [facility]  Eugenio Wallace  Date: 8/12/2020     Subjective:     Chief Complaint   Patient presents with    Cough     Dysphasia       HPI  Afia Mehta is a 80 y.o. male being seen today for dysphasia. Nursing staff report coughing with eating and are requesting evaluation by speech therapy. Resident is currently on regular diet with soft foods, thin consistency for liquids. Resident does have a history of oral pharyngeal dysphasia. Lung sounds today are clear and diminished in the lower lobes, no congestion. Will refer to speech therapy. Also needs help with ADLs as he is requiring more assistance. Consult OT. Vital signs stable, no fever. Resident has advanced dementia therefore is unable to obtain reliable subjective information. Nursing staff deny that he has had nausea vomiting chest pain shortness of breath. Just reporting cough with meals. Past Medical History:   Diagnosis Date    CAD (coronary artery disease)     Dementia without behavioral disturbance ([facility] [address] 7/27/2018    Gout     Hard of hearing     Hypertension     Injury to penis 10/6/2018    Obstructed, uropathy 7/27/2018    Osteoarthritis     Senile dementia (HCC)        Allergies:  Reviewed in nursing facility records  Medications:  Reviewed and reconciled in nursing facility records    Review of Systems Pertinent positives as noted in HPI.         Objective:       Physical Exam Constitutional:  up in wheelchair, well-developed, in no acute distess  ENT: Oropharynx normal, no nasal discharge, mucous membranes moist  Pulmonary/Chest:  breathing unlabored, chest excursion symmetrical, no use of accessory muscles, lung sounds clear, no shortness of breath, no dyspnea  Cardiovascular:  S1-S2 regular, 2+ DP x 2, no edema  Abd/GI:  abdomen soft, round, non-distended, non-tender, no masses, active bowel sounds x 4 quadrants  Psych:  A/O x 3, cooperative with care, no anxiety, appropriate mood and affect,      Diagnosis Orders   1. Oropharyngeal dysphagia         Assessment and Plan:      1. Oropharyngeal dysphagia  Consult OT and speech therapy, evaluate and treat. I have reviewed the patient's medical history in detail and updated the computerized patient record. This note was partially generated using Dragon voice recognition system, and there may be some incorrect words, spellings, punctuation that were not noticed in checking the note before saving.     Emiliana Byers, APRN-CNP

## 2020-09-03 LAB
ORGANISM: ABNORMAL
URINE CULTURE, ROUTINE: ABNORMAL

## 2020-10-20 RX ORDER — LORAZEPAM 0.5 MG/1
0.25 TABLET ORAL 2 TIMES DAILY
Qty: 60 TABLET | Refills: 0 | Status: SHIPPED | OUTPATIENT
Start: 2020-10-20 | End: 2020-11-19

## 2020-10-22 LAB
ANION GAP SERPL CALCULATED.3IONS-SCNC: 12 MEQ/L (ref 9–15)
BASOPHILS ABSOLUTE: 0 K/UL (ref 0–0.2)
BASOPHILS RELATIVE PERCENT: 0.3 %
BUN BLDV-MCNC: 26 MG/DL (ref 8–23)
CALCIUM SERPL-MCNC: 8.9 MG/DL (ref 8.5–9.9)
CHLORIDE BLD-SCNC: 106 MEQ/L (ref 95–107)
CO2: 24 MEQ/L (ref 20–31)
CREAT SERPL-MCNC: 1.2 MG/DL (ref 0.7–1.2)
D DIMER: 0.54 MG/L FEU (ref 0–0.5)
EOSINOPHILS ABSOLUTE: 0.3 K/UL (ref 0–0.7)
EOSINOPHILS RELATIVE PERCENT: 4.4 %
GFR AFRICAN AMERICAN: >60
GFR NON-AFRICAN AMERICAN: 56.5
GLUCOSE BLD-MCNC: 148 MG/DL (ref 70–99)
HCT VFR BLD CALC: 45.3 % (ref 42–52)
HEMOGLOBIN: 15.1 G/DL (ref 14–18)
LYMPHOCYTES ABSOLUTE: 1.5 K/UL (ref 1–4.8)
LYMPHOCYTES RELATIVE PERCENT: 26.8 %
MCH RBC QN AUTO: 30.7 PG (ref 27–31.3)
MCHC RBC AUTO-ENTMCNC: 33.3 % (ref 33–37)
MCV RBC AUTO: 92.4 FL (ref 80–100)
MONOCYTES ABSOLUTE: 0.5 K/UL (ref 0.2–0.8)
MONOCYTES RELATIVE PERCENT: 8.4 %
NEUTROPHILS ABSOLUTE: 3.5 K/UL (ref 1.4–6.5)
NEUTROPHILS RELATIVE PERCENT: 60.1 %
PDW BLD-RTO: 13.9 % (ref 11.5–14.5)
PLATELET # BLD: 176 K/UL (ref 130–400)
POTASSIUM SERPL-SCNC: 4.1 MEQ/L (ref 3.4–4.9)
RBC # BLD: 4.9 M/UL (ref 4.7–6.1)
SODIUM BLD-SCNC: 142 MEQ/L (ref 135–144)
TOTAL CK: 79 U/L (ref 0–190)
WBC # BLD: 5.8 K/UL (ref 4.8–10.8)

## 2020-10-23 ENCOUNTER — VIRTUAL VISIT (OUTPATIENT)
Dept: GERIATRIC MEDICINE | Age: 85
End: 2020-10-23
Payer: MEDICARE

## 2020-10-23 PROCEDURE — G8484 FLU IMMUNIZE NO ADMIN: HCPCS | Performed by: NURSE PRACTITIONER

## 2020-10-23 PROCEDURE — 99309 SBSQ NF CARE MODERATE MDM 30: CPT | Performed by: NURSE PRACTITIONER

## 2020-10-23 PROCEDURE — 1123F ACP DISCUSS/DSCN MKR DOCD: CPT | Performed by: NURSE PRACTITIONER

## 2020-10-25 ENCOUNTER — VIRTUAL VISIT (OUTPATIENT)
Dept: GERIATRIC MEDICINE | Age: 85
End: 2020-10-25
Payer: MEDICARE

## 2020-10-25 PROCEDURE — 1123F ACP DISCUSS/DSCN MKR DOCD: CPT | Performed by: INTERNAL MEDICINE

## 2020-10-25 PROCEDURE — G8484 FLU IMMUNIZE NO ADMIN: HCPCS | Performed by: INTERNAL MEDICINE

## 2020-10-25 PROCEDURE — 99309 SBSQ NF CARE MODERATE MDM 30: CPT | Performed by: INTERNAL MEDICINE

## 2020-10-26 VITALS
RESPIRATION RATE: 16 BRPM | DIASTOLIC BLOOD PRESSURE: 67 MMHG | HEART RATE: 83 BPM | OXYGEN SATURATION: 98 % | TEMPERATURE: 98.3 F | SYSTOLIC BLOOD PRESSURE: 127 MMHG

## 2020-10-26 PROBLEM — U07.1 COVID-19 VIRUS DETECTED: Status: ACTIVE | Noted: 2020-10-26

## 2020-10-26 ASSESSMENT — ENCOUNTER SYMPTOMS
ABDOMINAL DISTENTION: 0
VOMITING: 0
COUGH: 1
CHOKING: 0
ABDOMINAL PAIN: 0
CONSTIPATION: 0
DIARRHEA: 0
WHEEZING: 0
SHORTNESS OF BREATH: 0
NAUSEA: 0

## 2020-10-26 ASSESSMENT — PATIENT HEALTH QUESTIONNAIRE - PHQ9: DEPRESSION UNABLE TO ASSESS: FUNCTIONAL CAPACITY MOTIVATION LIMITS ACCURACY

## 2020-10-26 NOTE — PROGRESS NOTES
wheelchair, in no acute distress. Resident is alert and oriented to person, poor short and long-term memory secondary to dementia. He is aware of current condition and results of Covid testing. Nursing staff report mild agitation and anxiety that resolves with Ativan. We will continue to monitor closely for decompensation. Review of Systems   Constitutional: Negative for activity change, appetite change, chills and fever. HENT: Negative. Respiratory: Positive for cough. Negative for choking, shortness of breath and wheezing. Cardiovascular: Negative for chest pain, palpitations and leg swelling. Gastrointestinal: Negative for abdominal distention, abdominal pain, constipation, diarrhea, nausea and vomiting. Musculoskeletal: Positive for gait problem. Skin: Positive for pallor. Neurological: Positive for speech difficulty and weakness. Psychiatric/Behavioral: Positive for confusion. Negative for behavioral problems. The patient is nervous/anxious.         Allergies:  Reviewed in nursing facility records via point click care  Medications:  Reviewed and reconciled in nursing facility records via point click care    Past Medical History:   Diagnosis Date    CAD (coronary artery disease)     [facility]    Gout     Hard of hearing     Hypertension     Injury to penis 10/6/2018    Obstructed, uropathy 7/27/2018    Osteoarthritis     Senile dementia (HCC)         PHYSICAL EXAMINATION:  [ INSTRUCTIONS:  \"[x]\" Indicates a positive item  \"[]\" Indicates a negative item  -- DELETE ALL ITEMS NOT EXAMINED]    /67   Pulse 83   Temp 98.3 °F (36.8 °C)   Resp 16   SpO2 98%       [x] Alert  [x] Oriented to person    [x] No apparent distress  [] Toxic appearing    [] Face flushed appearing [x] Sclera clear  [] Lips are cyanotic      [x] Breathing appears normal  [] Appears tachypneic      [] Rash on visible skin    [] Cranial Nerves II-XII grossly intact    [x] Motor grossly intact in visible upper extremities    [x] Motor grossly intact in visible lower extremities    [x] Normal Mood  [] Anxious appearing    [] Depressed appearing  [x] Confused appearing      [x] Poor short term memory  [x] Poor long term memory     [x] OTHER:      Physical Exam Constitutional: Up in wheelchair, well-developed, elderly, frail  ENT:  conjunctive/lids normal, MMM, no runny nose  Pulmonary/Chest:  breathing unlabored, chest excursion symmetrical, no use of accessory muscles, no shortness of breath, no dyspnea. Lung sounds clear and diminished in lower lobes per nursing staff  Cardiovascular:  heart rate normal per point click care vital signs, no edema  Abd/GI:  abdomen, round, non-distended, positive bowel sounds per nursing staff  MS/Extremities:  SHAH, ROM limited, no clubbing, no cyanosis  Psych:  A/O x 3, cooperative with care, occ. anxiety, appropriate mood and affect  Skin:  color normal, no lesions, no rashes    Due to this being a TeleHealth encounter, evaluation of the following organ systems is limited: Vitals/Constitutional/EENT/Resp/CV/GI//MS/Neuro/Skin/Heme-Lymph-Imm. ASSESSMENT/PLAN:  1. COVID-19 virus detected  Continue isolation on Covid unit. CBCD, BMP, CRP Tuesday next week. Vital signs every 4 hours. Call if urine output less than 200 cc per shift. Continue vitamin C, vitamin D, zinc, and Lovenox as ordered. Call if poor intake, food and liquids. 2. Vascular dementia with behavior disturbance ([facility] [address]  At Baseline. Continue current medications as ordered. 3. Essential hypertension  Blood pressure within acceptable range. Not on any blood pressure medicines at this time. Vital signs every 4 hours while on Covid unit. 4. Anxiety  Continue Ativan 0.25 mg p.o. twice daily. An  electronic signature was used to authenticate this note.     --CARLENE Oliver CNP on 10/26/2020 at 3:28 PM    Reviewed labs:  Yes    Time based visit: time spent 25 minutes>50% spent with counseling and coordination of care. Reviewed with the patient and staff: current clinicalstatus, medications, activities and diet. Side effects, adverse effects of the medication prescribed, as well as treatment plan and result expectations. Discussed diagnostic results, other suggested diagnostic testing, prognosis, risk and benefits of treatment options, importance of compliance with treatment options with resident and nursing staff. Pursuant to the emergency declaration under the [address] [place] [address], [facility] waiver authority and the [employer] Act, this Virtual  Visit was conducted, with patient's consent, to reduce the patient's risk of exposure to COVID-19 and provide continuity of care for an established patient. Services were provided through a video synchronous discussion virtually to substitute for in-person clinic visit. This note was partially generated using Dragon voice recognition system, and there may be some incorrect words, spellings, punctuation that were not noticed in checking the note before saving.

## 2020-10-27 ENCOUNTER — VIRTUAL VISIT (OUTPATIENT)
Dept: GERIATRIC MEDICINE | Age: 85
End: 2020-10-27
Payer: MEDICARE

## 2020-10-27 VITALS
OXYGEN SATURATION: 96 % | HEART RATE: 98 BPM | DIASTOLIC BLOOD PRESSURE: 63 MMHG | TEMPERATURE: 96.9 F | RESPIRATION RATE: 16 BRPM | SYSTOLIC BLOOD PRESSURE: 125 MMHG

## 2020-10-27 LAB
ANION GAP SERPL CALCULATED.3IONS-SCNC: 19 MEQ/L (ref 9–15)
BASOPHILS ABSOLUTE: 0 K/UL (ref 0–0.2)
BASOPHILS RELATIVE PERCENT: 0.3 %
BUN BLDV-MCNC: 28 MG/DL (ref 8–23)
C-REACTIVE PROTEIN: 23.2 MG/L (ref 0–5)
CALCIUM SERPL-MCNC: 8.8 MG/DL (ref 8.5–9.9)
CHLORIDE BLD-SCNC: 107 MEQ/L (ref 95–107)
CO2: 16 MEQ/L (ref 20–31)
CREAT SERPL-MCNC: 1.29 MG/DL (ref 0.7–1.2)
EOSINOPHILS ABSOLUTE: 0.1 K/UL (ref 0–0.7)
EOSINOPHILS RELATIVE PERCENT: 1.1 %
GFR AFRICAN AMERICAN: >60
GFR NON-AFRICAN AMERICAN: 52
GLUCOSE BLD-MCNC: 128 MG/DL (ref 70–99)
HCT VFR BLD CALC: 45.3 % (ref 42–52)
HEMOGLOBIN: 15.3 G/DL (ref 14–18)
LYMPHOCYTES ABSOLUTE: 2.5 K/UL (ref 1–4.8)
LYMPHOCYTES RELATIVE PERCENT: 28 %
MCH RBC QN AUTO: 30.9 PG (ref 27–31.3)
MCHC RBC AUTO-ENTMCNC: 33.7 % (ref 33–37)
MCV RBC AUTO: 91.7 FL (ref 80–100)
MONOCYTES ABSOLUTE: 0.9 K/UL (ref 0.2–0.8)
MONOCYTES RELATIVE PERCENT: 10.7 %
NEUTROPHILS ABSOLUTE: 5.3 K/UL (ref 1.4–6.5)
NEUTROPHILS RELATIVE PERCENT: 59.9 %
PDW BLD-RTO: 14.2 % (ref 11.5–14.5)
PLATELET # BLD: 192 K/UL (ref 130–400)
POTASSIUM SERPL-SCNC: 4.8 MEQ/L (ref 3.4–4.9)
RBC # BLD: 4.94 M/UL (ref 4.7–6.1)
SODIUM BLD-SCNC: 142 MEQ/L (ref 135–144)
WBC # BLD: 8.8 K/UL (ref 4.8–10.8)

## 2020-10-27 PROCEDURE — 1123F ACP DISCUSS/DSCN MKR DOCD: CPT | Performed by: NURSE PRACTITIONER

## 2020-10-27 PROCEDURE — 99309 SBSQ NF CARE MODERATE MDM 30: CPT | Performed by: NURSE PRACTITIONER

## 2020-10-27 ASSESSMENT — ENCOUNTER SYMPTOMS
COUGH: 1
GASTROINTESTINAL NEGATIVE: 1
SHORTNESS OF BREATH: 0

## 2020-10-27 NOTE — PROGRESS NOTES
Name: [address]: [address]  [address]   Eugenio weber  10/27/2020    TELEHEALTH EVALUATION -- Audio/Visual (During XPTUQ-51 public health emergency)    Due to [place] 19 outbreak, patient's office visit was converted to a virtual visit. Patient was contacted and agreed to proceed with a virtual visit via \"Doxy. me\" and nursing assistance on site. The risks and benefits of converting to a virtual visit were discussed in light of the current infectious disease epidemic. Patient also understood that insurance coverage and co-pays are up to their individual insurance plans. Chief Complaint   Patient presents with    Follow-up     COVID-19, dementia       HPI:  Christine Abdi is a 80 y.o. male being seen today for an audio/video evaluation for the following concern(s):     Diagnosis Orders   1. COVID-19 virus detected     2. Vascular dementia with behavior disturbance ([facility] [address]     3. Essential hypertension     4. Onychomycosis       Asked to see resident for COVID-19 follow-up, lab review, medication review. Resident has been on Covid unit for more than a week. Nursing staff report that he has been free of fever, delirium, hypoxia. Appetite is normal, taking fluids with no difficulty, urine output is good. Resident has a history of dementia therefore unable to obtain reliable subjective information. Resident does state \"today is not as bad as yesterday. \"  Nursing staff state he had a good day yesterday, vital signs were stable, no shortness of breath, at baseline. During virtual evaluation he is up in the bedside chair, having breakfast, in no acute distress. He denies complaints of chest pain nausea vomiting diarrhea. Vital signs are stable, no fever. Inflammatory markers still mildly elevated. Mild dehydration; BUN/Creatinine elevated from baseline. Resident is on vitamin D, zinc, and vitamin C to support immune health and Lovenox for elevation in D-dimer.   His behaviors are at baseline, no anxiety or delirium. Resident has moist cough; last x-ray was negative for active cardiopulmonary process. Will continue to monitor closely. Review of Systems   Constitutional: Negative for activity change, appetite change, fatigue and fever. HENT: Negative. Respiratory: Positive for cough. Negative for shortness of breath. Cardiovascular: Negative for chest pain and leg swelling. Gastrointestinal: Negative. Genitourinary: Negative. Musculoskeletal: Negative. Skin: Negative. Neurological: Positive for weakness. Psychiatric/Behavioral: Positive for confusion. Negative for behavioral problems. The patient is not nervous/anxious.         Allergies:  Reviewed in nursing facility records via point click care  Medications:  Reviewed and reconciled in nursing facility records via point click care    Past Medical History:   Diagnosis Date    CAD (coronary artery disease)     Dementia without behavioral disturbance ([facility] [address] 7/27/2018    Gout     Hard of hearing     Hypertension     Injury to penis 10/6/2018    Obstructed, uropathy 7/27/2018    Osteoarthritis     Senile dementia (HCC)         PHYSICAL EXAMINATION:  [ INSTRUCTIONS:  \"[x]\" Indicates a positive item  \"[]\" Indicates a negative item  -- DELETE ALL ITEMS NOT EXAMINED]    /63   Pulse 98   Temp 96.9 °F (36.1 °C)   Resp 16   SpO2 96%       [x] Alert  [] Oriented to person/place/time    [x] No apparent distress  [] Toxic appearing    [] Face flushed appearing [x] Sclera clear  [] Lips are cyanotic      [x] Breathing appears normal  [] Appears tachypneic      [] Rash on visible skin    [] Cranial Nerves II-XII grossly intact    [x] Motor grossly intact in visible upper extremities    [x] Motor grossly intact in visible lower extremities    [x] Normal Mood  [] Anxious appearing    [] Depressed appearing  [x] Confused appearing      [x] Poor short term memory  [x] Poor long term memory    [x] OTHER: Physical Exam Constitutional:  up in wheelchair, well-developed, elderly, frail  ENT:  conjunctive/lids normal, MMM, no runny nose  Pulmonary/Chest:  breathing unlabored, chest excursion symmetrical, no use of accessory muscles, no shortness of breath, no dyspnea. Lung sounds clear per nursing staff  Cardiovascular:  heart rate normal per point click care vital signs, no edema  Abd/GI:  abdomen, round, non-distended, positive bowel sounds per nursing staff  MS/Extremities:  SHAH, ROM limited, no clubbing, no cyanosis  Psych:  A/O x 1, cooperative with care, no anxiety  Skin:  color normal, no lesions, no rashes    Due to this being a TeleHealth encounter, evaluation of the following organ systems is limited: Vitals/Constitutional/EENT/Resp/CV/GI//MS/Neuro/Skin/Heme-Lymph-Imm. ASSESSMENT/PLAN:  1. COVID-19 virus detected  No fever documented within the last 72 hours. Repeat blood work within acceptable range, very mild elevation BUN and creatinine. Appetite is good, urine output is acceptable based on intake, O2 saturations are 93% on room air, no shortness of breath. Inflammatory markers are decreasing. Resident to transfer back to his room if asymptomatic x10 days. Continue all medications as ordered. CBCD, BMP, CRP Friday. 2. Vascular dementia with behavior disturbance ([facility] [address]  Mental status and behaviors are at baseline. Continue medications as ordered. 3. Hypertension  Not currently on antihypertensive medicine. Blood pressure within acceptable range. Labs within acceptable range. 4. Onychomycosis  Podiatry consult when out of Covid unit. An  electronic signature was used to authenticate this note. --CARLENE Cooney CNP on 10/29/2020 at 7:34 PM    Reviewed labs:  Yes    Time based visit:  time spent 25 minutes>50% spent with counseling and coordination of care. Reviewed with the patient: current clinicalstatus, medications, activities and diet.   Is not able to verbalize understanding of instructions due to dementia. Family notified of current treatment plan, diagnostic results, other suggested diagnostic testing, prognosis, risk and benefits of treatment options. Reviewed treatment plan with nursing staff and respiratory therapy. Pursuant to the emergency declaration under the [facility], [facility] waiver authority and the [employer] and [employer] Act, this Virtual  Visit was conducted, with patient's consent, to reduce the patient's risk of exposure to COVID-19 and provide continuity of care for an established patient. Services were provided through a video synchronous discussion virtually to substitute for in-person clinic visit. This note was partially generated using Dragon voice recognition system, and there may be some incorrect words, spellings, punctuation that were not noticed in checking the note before saving.

## 2020-10-29 PROBLEM — B35.1 ONYCHOMYCOSIS: Status: ACTIVE | Noted: 2020-10-29

## 2020-10-30 ENCOUNTER — VIRTUAL VISIT (OUTPATIENT)
Dept: GERIATRIC MEDICINE | Age: 85
End: 2020-10-30
Payer: MEDICARE

## 2020-10-30 LAB
ANION GAP SERPL CALCULATED.3IONS-SCNC: 12 MEQ/L (ref 9–15)
BASOPHILS ABSOLUTE: 0 K/UL (ref 0–0.2)
BASOPHILS RELATIVE PERCENT: 0.5 %
BUN BLDV-MCNC: 33 MG/DL (ref 8–23)
C-REACTIVE PROTEIN: 25.3 MG/L (ref 0–5)
CALCIUM SERPL-MCNC: 9.5 MG/DL (ref 8.5–9.9)
CHLORIDE BLD-SCNC: 108 MEQ/L (ref 95–107)
CO2: 24 MEQ/L (ref 20–31)
CREAT SERPL-MCNC: 1.2 MG/DL (ref 0.7–1.2)
EOSINOPHILS ABSOLUTE: 0.2 K/UL (ref 0–0.7)
EOSINOPHILS RELATIVE PERCENT: 2.8 %
GFR AFRICAN AMERICAN: >60
GFR NON-AFRICAN AMERICAN: 56.5
GLUCOSE BLD-MCNC: 129 MG/DL (ref 70–99)
HCT VFR BLD CALC: 43.5 % (ref 42–52)
HEMOGLOBIN: 14.4 G/DL (ref 14–18)
LYMPHOCYTES ABSOLUTE: 1.8 K/UL (ref 1–4.8)
LYMPHOCYTES RELATIVE PERCENT: 25.3 %
MCH RBC QN AUTO: 30.2 PG (ref 27–31.3)
MCHC RBC AUTO-ENTMCNC: 33.1 % (ref 33–37)
MCV RBC AUTO: 91.5 FL (ref 80–100)
MONOCYTES ABSOLUTE: 0.6 K/UL (ref 0.2–0.8)
MONOCYTES RELATIVE PERCENT: 8.1 %
NEUTROPHILS ABSOLUTE: 4.5 K/UL (ref 1.4–6.5)
NEUTROPHILS RELATIVE PERCENT: 63.3 %
PDW BLD-RTO: 14 % (ref 11.5–14.5)
PLATELET # BLD: 219 K/UL (ref 130–400)
POTASSIUM SERPL-SCNC: 4.5 MEQ/L (ref 3.4–4.9)
RBC # BLD: 4.76 M/UL (ref 4.7–6.1)
SODIUM BLD-SCNC: 144 MEQ/L (ref 135–144)
WBC # BLD: 7.1 K/UL (ref 4.8–10.8)

## 2020-10-30 PROCEDURE — 1123F ACP DISCUSS/DSCN MKR DOCD: CPT | Performed by: INTERNAL MEDICINE

## 2020-10-30 PROCEDURE — G8484 FLU IMMUNIZE NO ADMIN: HCPCS | Performed by: INTERNAL MEDICINE

## 2020-10-30 PROCEDURE — 99308 SBSQ NF CARE LOW MDM 20: CPT | Performed by: INTERNAL MEDICINE

## 2020-11-03 ENCOUNTER — OFFICE VISIT (OUTPATIENT)
Dept: GERIATRIC MEDICINE | Age: 85
End: 2020-11-03

## 2020-11-03 DIAGNOSIS — U07.1 COVID-19 VIRUS DETECTED: Primary | ICD-10-CM

## 2020-11-03 DIAGNOSIS — F01.518 VASCULAR DEMENTIA WITH BEHAVIOR DISTURBANCE: ICD-10-CM

## 2020-11-03 PROCEDURE — G8484 FLU IMMUNIZE NO ADMIN: HCPCS | Performed by: NURSE PRACTITIONER

## 2020-11-03 PROCEDURE — 1123F ACP DISCUSS/DSCN MKR DOCD: CPT | Performed by: NURSE PRACTITIONER

## 2020-11-03 PROCEDURE — 99308 SBSQ NF CARE LOW MDM 20: CPT | Performed by: NURSE PRACTITIONER

## 2020-11-12 LAB
ALBUMIN SERPL-MCNC: 3.8 G/DL (ref 3.5–4.6)
ALP BLD-CCNC: 108 U/L (ref 35–104)
ALT SERPL-CCNC: 209 U/L (ref 0–41)
ANION GAP SERPL CALCULATED.3IONS-SCNC: 24 MEQ/L (ref 9–15)
AST SERPL-CCNC: 175 U/L (ref 0–40)
BASOPHILS ABSOLUTE: 0.1 K/UL (ref 0–0.2)
BASOPHILS RELATIVE PERCENT: 0.4 %
BILIRUB SERPL-MCNC: 0.7 MG/DL (ref 0.2–0.7)
BUN BLDV-MCNC: 108 MG/DL (ref 8–23)
CALCIUM SERPL-MCNC: 10.2 MG/DL (ref 8.5–9.9)
CHLORIDE BLD-SCNC: 123 MEQ/L (ref 95–107)
CO2: 16 MEQ/L (ref 20–31)
CREAT SERPL-MCNC: 3.79 MG/DL (ref 0.7–1.2)
EOSINOPHILS ABSOLUTE: 0.3 K/UL (ref 0–0.7)
EOSINOPHILS RELATIVE PERCENT: 2.3 %
GFR AFRICAN AMERICAN: 18.1
GFR NON-AFRICAN AMERICAN: 15
GLOBULIN: 3.6 G/DL (ref 2.3–3.5)
GLUCOSE BLD-MCNC: 154 MG/DL (ref 70–99)
HCT VFR BLD CALC: 57.6 % (ref 42–52)
HEMOGLOBIN: 18.5 G/DL (ref 14–18)
LYMPHOCYTES ABSOLUTE: 2.3 K/UL (ref 1–4.8)
LYMPHOCYTES RELATIVE PERCENT: 17.1 %
MCH RBC QN AUTO: 30.1 PG (ref 27–31.3)
MCHC RBC AUTO-ENTMCNC: 32 % (ref 33–37)
MCV RBC AUTO: 93.9 FL (ref 80–100)
MONOCYTES ABSOLUTE: 1 K/UL (ref 0.2–0.8)
MONOCYTES RELATIVE PERCENT: 7.9 %
NEUTROPHILS ABSOLUTE: 9.6 K/UL (ref 1.4–6.5)
NEUTROPHILS RELATIVE PERCENT: 72.3 %
PDW BLD-RTO: 15.3 % (ref 11.5–14.5)
PLATELET # BLD: 308 K/UL (ref 130–400)
POTASSIUM SERPL-SCNC: 4.9 MEQ/L (ref 3.4–4.9)
RBC # BLD: 6.14 M/UL (ref 4.7–6.1)
SODIUM BLD-SCNC: 163 MEQ/L (ref 135–144)
TOTAL PROTEIN: 7.4 G/DL (ref 6.3–8)
WBC # BLD: 13.3 K/UL (ref 4.8–10.8)

## 2020-11-25 NOTE — PROGRESS NOTES
Subjective:     CC: COVID-19    HPI:  Umair Ritchie is a 80 y.o. male was seen today for COVID 19 evaluation. Patient has been COVID 19 positive since one week. They were seen with full PPE and observing continued droplet precautions. Condition discussed with nursing staff and treatment reviewed. Recent bloodwork, chest x-ray and vital signs reviewed. Fever curve and oxygen demands reviewed. Nutritional status and intake reviewed. Patient is demonstrating increased respiratory complaints at this time. Patient has not been febrile in the last 24 hours. Patient is not able to feed themselves. Past Medical History:   Diagnosis Date    CAD (coronary artery disease)     Dementia without behavioral disturbance ([facility] [address] 7/27/2018    Gout     Hard of hearing     Hypertension     Injury to penis 10/6/2018    Obstructed, uropathy 7/27/2018    Osteoarthritis     [facility]      No past surgical history on file. No family history on file. Social History     Socioeconomic History    Marital status:       Spouse name: Not on file    Number of children: Not on file    Years of education: Not on file    Highest education level: Not on file   Occupational History    Not on file   Social Needs    Financial resource strain: Not on file    Food insecurity     Worry: Not on file     Inability: Not on file    Transportation needs     Medical: Not on file     Non-medical: Not on file   Tobacco Use    Smoking status: Former Smoker    Smokeless tobacco: Never Used   Substance and Sexual Activity    Alcohol use: Not Currently    Drug use: No    Sexual activity: Not on file   Lifestyle    Physical activity     Days per week: Not on file     Minutes per session: Not on file    Stress: Not on file   Relationships    Social connections     Talks on phone: Not on file     Gets together: Not on file     Attends [employer]odoxy service: Not on file     Active member of club or organization: Not on file     Attends meetings of clubs or organizations: Not on file     Relationship status: Not on file    Intimate partner violence     Fear of current or ex partner: Not on file     Emotionally abused: Not on file     Physically abused: Not on file     Forced sexual activity: Not on file   Other Topics Concern    Not on file   Social History Narrative    Not on file     Current Outpatient Medications on File Prior to Visit   Medication Sig Dispense Refill    morphine sulfate 20 MG/ML concentrated oral solution Take 5 mg by mouth every 2 hours as needed for Pain. [place] diphenhydrAMINE (BENADRYL) 25 MG tablet Take 25 mg by mouth every 8 hours as needed for Itching      hydrocortisone 1 % cream Apply topically 2 times daily Apply topically 2 times daily.  potassium chloride (KLOR-CON M20) 20 MEQ extended release tablet Take 20 mEq by mouth daily      furosemide (LASIX) 40 MG tablet Take 60 mg by mouth daily       mirtazapine (REMERON) 15 MG tablet Take 7.5 mg by mouth nightly      acetaminophen (TYLENOL) 650 MG suppository Place 650 mg rectally 2 times daily      albuterol (PROVENTIL) (2.5 MG/3ML) 0.083% nebulizer solution Take 2.5 mg by nebulization every 4 hours as needed for Wheezing or Shortness of Breath      aspirin 81 MG tablet Take 81 mg by mouth daily       No current facility-administered medications on file prior to visit. Review of Systems   Unable to perform ROS: Mental status change       Objective:     Physical Exam  Constitutional:       Appearance: He is ill-appearing. HENT:      Head: Atraumatic. Neck:      Musculoskeletal: Neck supple. Cardiovascular:      Rate and Rhythm: Regular rhythm. Pulmonary:      Breath sounds: No rhonchi. Chest:      Chest wall: No tenderness. Abdominal:      Palpations: Abdomen is soft. Skin:     Coloration: Skin is not jaundiced. Findings: No rash.           .lastLDH  No results found for: FERRITIN  Lab Results   Component Value Date    WBC 13.3 (H) 11/12/2020    HGB 18.5 (H) 11/12/2020    HCT 57.6 (H) 11/12/2020    MCV 93.9 11/12/2020     11/12/2020     Lab Results   Component Value Date     11/12/2020    K 4.9 11/12/2020     11/12/2020    CO2 16 11/12/2020     11/12/2020    CREATININE 3.79 11/12/2020    GLUCOSE 154 11/12/2020    CALCIUM 10.2 11/12/2020      Lab Results   Component Value Date    CKTOTAL 79 10/22/2020     Lab Results   Component Value Date    CRP 25.3 (H) 10/30/2020      Lab Results   Component Value Date    DDIMER 0.54 ([facility]) 10/22/2020      Lab Results   Component Value Date    CKTOTAL 79 10/22/2020       No results found. Raghu Lopez [facility]      Assessment & Plan:     Continue with current regimen of  Vitamin C 500mg PO BID  Vitamin D 1000 IU PO QD  Zinc 50 mg PO QD  Lovenox 30mg SQ BID    Patient does require Dexamethasone 6mg PO QD  Patient does not  require antibiotics for concomitant bacterial pneumonia. Continue to monitor blood work:  Weekly D-Dimer, LDH, CRP, CPK, Procalcitonin, Ferritin, Troponin, CBC, BMP. Patient's case discussed with nursing staff and Code Status reviewed. Rema Jason MD     Yun Turpin is a 80 y.o. male being evaluated by a Virtual Visit (video visit) encounter to address concerns as mentioned above. A caregiver was present when appropriate. Due to this being a TeleHealth encounter (During YZKEA-11 public health emergency), evaluation of the following organ systems was limited: Vitals/Constitutional/EENT/Resp/CV/GI//MS/Neuro/Skin/Heme-Lymph-Imm. Pursuant to the emergency declaration under the [address] [place] [address], [address] [place] [address] authority and the [employer] and [employer] Act, this Virtual Visit was conducted with patient's (and/or legal guardian's) consent, to reduce the patient's risk of exposure to COVID-19 and provide necessary medical care.   The patient (and/or legal guardian) has also been advised to contact this office for worsening conditions or problems, and seek emergency medical treatment and/or call 911 if deemed necessary. Patient identification was verified at the start of the visit: Yes    Total time spent for this encounter: Not billed by time    Services were provided through a video synchronous discussion virtually to substitute for in-person clinic visit. Patient and provider were located at their individual homes. --Eduardo Knight MD on 11/24/2020 at 11:49 PM    An electronic signature was used to authenticate this note.

## 2020-11-30 NOTE — PROGRESS NOTES
Subjective:     CC: COVID-19    HPI:  Luana Wei is a 80 y.o. male was seen today for COVID 19 evaluation. Patient has been COVID 19 positive since 10/23. They were seen with full PPE and observing continued droplet precautions. Condition discussed with nursing staff and treatment reviewed. Recent bloodwork, chest x-ray and vital signs reviewed. Fever curve and oxygen demands reviewed. Nutritional status and intake reviewed. Patient is not demonstrating increased respiratory complaints at this time. Patient has not been febrile in the last 24 hours. Patient is able to feed themselves. Past Medical History:   Diagnosis Date    CAD (coronary artery disease)     Dementia without behavioral disturbance ([facility] [address] 7/27/2018    Gout     Hard of hearing     Hypertension     Injury to penis 10/6/2018    Obstructed, uropathy 7/27/2018    Osteoarthritis     Senile dementia ([facility] [address]      No past surgical history on file. No family history on file. Social History     Socioeconomic History    Marital status:       Spouse name: Not on file    Number of children: Not on file    Years of education: Not on file    Highest education level: Not on file   Occupational History    Not on file   Social Needs    Financial resource strain: Not on file    Food insecurity     Worry: Not on file     Inability: Not on file    Transportation needs     Medical: Not on file     Non-medical: Not on file   Tobacco Use    Smoking status: Former Smoker    Smokeless tobacco: Never Used   Substance and Sexual Activity    Alcohol use: Not Currently    Drug use: No    Sexual activity: Not on file   Lifestyle    Physical activity     Days per week: Not on file     Minutes per session: Not on file    Stress: Not on file   Relationships    Social connections     Talks on phone: Not on file     Gets together: Not on file     Attends [employer] service: Not on file     Active member of club or organization: Not on file     Attends meetings of clubs or organizations: Not on file     Relationship status: Not on file    Intimate partner violence     Fear of current or ex partner: Not on file     Emotionally abused: Not on file     Physically abused: Not on file     Forced sexual activity: Not on file   Other Topics Concern    Not on file   Social History Narrative    Not on file     Current Outpatient Medications on File Prior to Visit   Medication Sig Dispense Refill    morphine sulfate 20 MG/ML concentrated oral solution Take 5 mg by mouth every 2 hours as needed for Pain. [place] diphenhydrAMINE (BENADRYL) 25 MG tablet Take 25 mg by mouth every 8 hours as needed for Itching      hydrocortisone 1 % cream Apply topically 2 times daily Apply topically 2 times daily.  potassium chloride (KLOR-CON M20) 20 MEQ extended release tablet Take 20 mEq by mouth daily      furosemide (LASIX) 40 MG tablet Take 60 mg by mouth daily       mirtazapine (REMERON) 15 MG tablet Take 7.5 mg by mouth nightly      acetaminophen (TYLENOL) 650 MG suppository Place 650 mg rectally 2 times daily      albuterol (PROVENTIL) (2.5 MG/3ML) 0.083% nebulizer solution Take 2.5 mg by nebulization every 4 hours as needed for Wheezing or Shortness of Breath      aspirin 81 MG tablet Take 81 mg by mouth daily       No current facility-administered medications on file prior to visit. Review of Systems   Unable to perform ROS: Dementia       Objective:     Physical Exam  Vitals signs and nursing note reviewed. Constitutional:       Appearance: He is ill-appearing. HENT:      Head: Atraumatic. Cardiovascular:      Rate and Rhythm: Regular rhythm. Pulmonary:      Breath sounds: Rhonchi present. Abdominal:      General: Bowel sounds are normal. There is no distension. Skin:     Findings: No rash. Neurological:      Mental Status: He is disoriented. Motor: Weakness present. Catalina Pedroza  No results found for: FERRITIN  Lab Results   Component Value Date    WBC 13.3 (H) 11/12/2020    HGB 18.5 (H) 11/12/2020    HCT 57.6 (H) 11/12/2020    MCV 93.9 11/12/2020     11/12/2020     Lab Results   Component Value Date     11/12/2020    K 4.9 11/12/2020     11/12/2020    CO2 16 11/12/2020     11/12/2020    CREATININE 3.79 11/12/2020    GLUCOSE 154 11/12/2020    CALCIUM 10.2 11/12/2020      Lab Results   Component Value Date    CKTOTAL 79 10/22/2020     Lab Results   Component Value Date    CRP 25.3 (H) 10/30/2020      Lab Results   Component Value Date    DDIMER 0.54 ([facility]) 10/22/2020      Lab Results   Component Value Date    CKTOTAL 79 10/22/2020       No results found. Stephanie Sourav [facility]      Assessment & Plan:     Continue with current regimen of  Vitamin C 500mg PO BID  Vitamin D 1000 IU PO QD  Zinc 50 mg PO QD  Lovenox 30mg SQ BID    Patient does require Dexamethasone 6mg PO QD  Patient does not  require antibiotics for concomitant bacterial pneumonia. Continue to monitor blood work:  Weekly D-Dimer, LDH, CRP, CPK, Procalcitonin, Ferritin, Troponin, CBC, BMP. Patient's case discussed with nursing staff and Code Status reviewed. MD Sal Farrarprachi Pierce is a 80 y.o. male being evaluated by a Virtual Visit (video visit) encounter to address concerns as mentioned above. A caregiver was present when appropriate. Due to this being a TeleHealth encounter (During [facility] public health emergency), evaluation of the following organ systems was limited: Vitals/Constitutional/EENT/Resp/CV/GI//MS/Neuro/Skin/Heme-Lymph-Imm. Pursuant to the emergency declaration under the [facility], [address] [place] [address] authority and the [employer] and [employer] Act, this Virtual Visit was conducted with patient's (and/or legal guardian's) consent, to reduce the patient's risk of exposure to COVID-19 and provide necessary medical care.   The patient

## 2021-01-31 VITALS
OXYGEN SATURATION: 98 % | DIASTOLIC BLOOD PRESSURE: 70 MMHG | RESPIRATION RATE: 18 BRPM | HEART RATE: 64 BPM | SYSTOLIC BLOOD PRESSURE: 123 MMHG | TEMPERATURE: 97 F

## 2021-01-31 NOTE — PROGRESS NOTES
Name: [address]: [facility]  [facility]  Eugenio Wallace  Date: 11/3/2020     Subjective:     Chief Complaint   Patient presents with    Follow-up     Covid 23, dementia       HPI  Mohamud Ryan is a 80 y.o. male being seen today for follow-up evaluation for COVID-19 infection and vascular dementia. Resident has been in the Covid unit in isolation from 10/21/2020 to 11/3/2020. He is asymptomatic, no cough no fever no congestion no lightheadedness no dizziness. He is sitting on the side of his bed, frail, elderly, thin, no acute distress. While he has vascular dementia he can answer some simple yes/no questions by moaning yes. Staff report that appetite is good, fluid intake is acceptable, incontinent of bowel and bladder urine output acceptable. He is transferred back to his room on the [address]. Still receiving Covid medications, will discontinue in a few days. Labs are beginning to return to baseline. Vital signs stable, no fever. Past Medical History:   Diagnosis Date    CAD (coronary artery disease)     Dementia without behavioral disturbance ([address] 7/27/2018    Gout     Hard of hearing     Hypertension     Injury to penis 10/6/2018    Obstructed, uropathy 7/27/2018    Osteoarthritis     Senile dementia (HCC)        Allergies:  Reviewed in nursing facility records  Medications:  Reviewed and reconciled in nursing facility records    Review of Systems Pertinent positives as noted in HPI. Objective:   /70   Pulse 64   Temp 97 °F (36.1 °C)   Resp 18   SpO2 98%     Physical Exam resident sitting on the side of the bed, STNA present with him, in no acute distress. Oropharynx normal mucous membranes moist no teeth. Respirations nonlabored lung sounds clear and diminished in lower lobes no shortness of breath at rest.  S1-S2 regular no edema. Abdomen soft flat nontender nondistended bowel sounds present x4 quadrants.   Alert and oriented x1
